# Patient Record
Sex: MALE | Race: WHITE | NOT HISPANIC OR LATINO
[De-identification: names, ages, dates, MRNs, and addresses within clinical notes are randomized per-mention and may not be internally consistent; named-entity substitution may affect disease eponyms.]

---

## 2018-12-26 ENCOUNTER — APPOINTMENT (OUTPATIENT)
Dept: NEUROSURGERY | Facility: CLINIC | Age: 48
End: 2018-12-26
Payer: MEDICARE

## 2018-12-26 DIAGNOSIS — Z78.9 OTHER SPECIFIED HEALTH STATUS: ICD-10-CM

## 2018-12-26 DIAGNOSIS — Z86.79 PERSONAL HISTORY OF OTHER DISEASES OF THE CIRCULATORY SYSTEM: ICD-10-CM

## 2018-12-26 DIAGNOSIS — Z85.46 PERSONAL HISTORY OF MALIGNANT NEOPLASM OF PROSTATE: ICD-10-CM

## 2018-12-26 DIAGNOSIS — Z86.69 PERSONAL HISTORY OF OTHER DISEASES OF THE NERVOUS SYSTEM AND SENSE ORGANS: ICD-10-CM

## 2018-12-26 PROCEDURE — 99214 OFFICE O/P EST MOD 30 MIN: CPT

## 2019-01-24 PROBLEM — Z86.79 HISTORY OF CORONARY ARTERY DISEASE: Status: RESOLVED | Noted: 2019-01-24 | Resolved: 2019-01-24

## 2019-01-24 PROBLEM — Z85.46 HISTORY OF MALIGNANT NEOPLASM OF PROSTATE: Status: RESOLVED | Noted: 2019-01-24 | Resolved: 2019-01-24

## 2019-01-24 PROBLEM — Z86.69 HISTORY OF SLEEP APNEA: Status: RESOLVED | Noted: 2019-01-24 | Resolved: 2019-01-24

## 2019-01-24 PROBLEM — Z78.9 NON-SMOKER: Status: ACTIVE | Noted: 2019-01-24

## 2019-01-24 NOTE — HISTORY OF PRESENT ILLNESS
[FreeTextEntry1] : I am seeing Mr. Cordova in f/u. He has myriad symptomatic complaints. He has had multiple traumatic injuries with compression fractures of various ages and has significant residual back back with radicular component. He has had multiple images of the thoracic and lumbar spine including myelogram which is significant only for T6/T8 compression fracture, post vertebroplasty, T9 and T12 compression deformity, sclerotic. No significant central or foraminal stenosis are noted. \par \par Mr. Cordova also discussed episodes of ?LOC. He was referred for EEG which showed no seizure activity. \par \par He underwent trial of SCS with more than 70% relief in LBP, however, his pain mangement DrIsabella no longer takes his insurance and was referred to another pain management Dr. for his pain f/u. \par \par He is pending endocrinology consultation to ensure good bone health. Today he is here to discuss further SCS implantation.

## 2019-01-24 NOTE — PHYSICAL EXAM
[FreeTextEntry1] : Constitutional: Well appearing, no distress\par HEENT: Normocephalic Atraumatic\par Psychiatric: Alert and oriented to all spheres, normal mood\par Pulmonary: no respiratory distress\par Abdomen: non-distended\par Vascular/Extremities: no edema, no cyanosis, no clubbing\par \par \par Neurologic: \par CN II-XII grossly intact\par ROM: severely restricted in thoracolumbar spine due to pain\par Palpation: pain to palpation in thoracic lumbar spine\par Strength: Full strength in all major muscle groups, no atrophy\par Sensation: Full sensation to light touch in all extremities\par Reflexes: \par               2+ patellar\par               2+ biceps\par               2+ ankle jerk\par              No Dent's\par              No clonus\par              No babinski\par \par Signs:\par SLR negative\par L'hermitte's negative\par \par Gait:antalgic\par \par \par \par

## 2019-03-21 RX ORDER — CLONAZEPAM 1 MG/1
1 TABLET ORAL
Refills: 0 | Status: ACTIVE | COMMUNITY

## 2019-03-21 RX ORDER — APIXABAN 5 MG/1
5 TABLET, FILM COATED ORAL
Refills: 0 | Status: ACTIVE | COMMUNITY

## 2019-03-21 RX ORDER — TAMSULOSIN HYDROCHLORIDE 0.4 MG/1
0.4 CAPSULE ORAL
Refills: 0 | Status: ACTIVE | COMMUNITY

## 2019-03-21 RX ORDER — ATORVASTATIN CALCIUM 80 MG/1
TABLET, FILM COATED ORAL
Refills: 0 | Status: ACTIVE | COMMUNITY

## 2019-03-25 ENCOUNTER — RESULT REVIEW (OUTPATIENT)
Age: 49
End: 2019-03-25

## 2019-03-25 ENCOUNTER — INPATIENT (INPATIENT)
Facility: HOSPITAL | Age: 49
LOS: 9 days | Discharge: NURSING, REHAB OR LONG-TERM CARE | End: 2019-04-04
Attending: STUDENT IN AN ORGANIZED HEALTH CARE EDUCATION/TRAINING PROGRAM | Admitting: STUDENT IN AN ORGANIZED HEALTH CARE EDUCATION/TRAINING PROGRAM
Payer: MEDICARE

## 2019-03-25 ENCOUNTER — APPOINTMENT (OUTPATIENT)
Dept: NEUROSURGERY | Facility: HOSPITAL | Age: 49
End: 2019-03-25
Payer: MEDICARE

## 2019-03-25 VITALS
RESPIRATION RATE: 18 BRPM | WEIGHT: 220.02 LBS | SYSTOLIC BLOOD PRESSURE: 153 MMHG | HEIGHT: 70 IN | DIASTOLIC BLOOD PRESSURE: 99 MMHG | TEMPERATURE: 97 F | HEART RATE: 69 BPM

## 2019-03-25 DIAGNOSIS — Z80.42 FAMILY HISTORY OF MALIGNANT NEOPLASM OF PROSTATE: Chronic | ICD-10-CM

## 2019-03-25 DIAGNOSIS — Z98.890 OTHER SPECIFIED POSTPROCEDURAL STATES: Chronic | ICD-10-CM

## 2019-03-25 DIAGNOSIS — Z95.810 PRESENCE OF AUTOMATIC (IMPLANTABLE) CARDIAC DEFIBRILLATOR: Chronic | ICD-10-CM

## 2019-03-25 LAB — GAS PNL BLDA: SIGNIFICANT CHANGE UP

## 2019-03-25 PROCEDURE — 63685 INS/RPLC SPI NPG/RCVR POCKET: CPT | Mod: AS

## 2019-03-25 PROCEDURE — 63685 INS/RPLC SPI NPG/RCVR POCKET: CPT

## 2019-03-25 PROCEDURE — 88311 DECALCIFY TISSUE: CPT | Mod: 26

## 2019-03-25 PROCEDURE — 63655 IMPLANT NEUROELECTRODES: CPT | Mod: AS

## 2019-03-25 PROCEDURE — 88304 TISSUE EXAM BY PATHOLOGIST: CPT | Mod: 26

## 2019-03-25 PROCEDURE — 63655 IMPLANT NEUROELECTRODES: CPT

## 2019-03-25 RX ORDER — ACETAMINOPHEN 500 MG
650 TABLET ORAL EVERY 6 HOURS
Qty: 0 | Refills: 0 | Status: DISCONTINUED | OUTPATIENT
Start: 2019-03-25 | End: 2019-04-04

## 2019-03-25 RX ORDER — SENNA PLUS 8.6 MG/1
2 TABLET ORAL AT BEDTIME
Qty: 0 | Refills: 0 | Status: DISCONTINUED | OUTPATIENT
Start: 2019-03-25 | End: 2019-03-30

## 2019-03-25 RX ORDER — OXYCODONE AND ACETAMINOPHEN 5; 325 MG/1; MG/1
2 TABLET ORAL EVERY 4 HOURS
Qty: 0 | Refills: 0 | Status: DISCONTINUED | OUTPATIENT
Start: 2019-03-25 | End: 2019-04-01

## 2019-03-25 RX ORDER — HYDROMORPHONE HYDROCHLORIDE 2 MG/ML
1 INJECTION INTRAMUSCULAR; INTRAVENOUS; SUBCUTANEOUS
Qty: 0 | Refills: 0 | Status: DISCONTINUED | OUTPATIENT
Start: 2019-03-25 | End: 2019-03-25

## 2019-03-25 RX ORDER — PANTOPRAZOLE SODIUM 20 MG/1
40 TABLET, DELAYED RELEASE ORAL
Qty: 0 | Refills: 0 | Status: DISCONTINUED | OUTPATIENT
Start: 2019-03-25 | End: 2019-04-04

## 2019-03-25 RX ORDER — SODIUM CHLORIDE 9 MG/ML
1000 INJECTION, SOLUTION INTRAVENOUS
Qty: 0 | Refills: 0 | Status: DISCONTINUED | OUTPATIENT
Start: 2019-03-25 | End: 2019-03-26

## 2019-03-25 RX ORDER — ALPRAZOLAM 0.25 MG
1 TABLET ORAL THREE TIMES A DAY
Qty: 0 | Refills: 0 | Status: DISCONTINUED | OUTPATIENT
Start: 2019-03-25 | End: 2019-03-25

## 2019-03-25 RX ORDER — METHOCARBAMOL 500 MG/1
750 TABLET, FILM COATED ORAL EVERY 8 HOURS
Qty: 0 | Refills: 0 | Status: DISCONTINUED | OUTPATIENT
Start: 2019-03-25 | End: 2019-04-04

## 2019-03-25 RX ORDER — MORPHINE SULFATE 50 MG/1
2 CAPSULE, EXTENDED RELEASE ORAL EVERY 4 HOURS
Qty: 0 | Refills: 0 | Status: DISCONTINUED | OUTPATIENT
Start: 2019-03-25 | End: 2019-03-31

## 2019-03-25 RX ORDER — OXYCODONE AND ACETAMINOPHEN 5; 325 MG/1; MG/1
1 TABLET ORAL EVERY 4 HOURS
Qty: 0 | Refills: 0 | Status: DISCONTINUED | OUTPATIENT
Start: 2019-03-25 | End: 2019-03-25

## 2019-03-25 RX ORDER — ONDANSETRON 8 MG/1
4 TABLET, FILM COATED ORAL EVERY 6 HOURS
Qty: 0 | Refills: 0 | Status: DISCONTINUED | OUTPATIENT
Start: 2019-03-25 | End: 2019-04-04

## 2019-03-25 RX ORDER — CEFAZOLIN SODIUM 1 G
1000 VIAL (EA) INJECTION EVERY 8 HOURS
Qty: 0 | Refills: 0 | Status: COMPLETED | OUTPATIENT
Start: 2019-03-25 | End: 2019-03-26

## 2019-03-25 RX ORDER — LURASIDONE HYDROCHLORIDE 40 MG/1
60 TABLET ORAL DAILY
Qty: 0 | Refills: 0 | Status: DISCONTINUED | OUTPATIENT
Start: 2019-03-25 | End: 2019-04-04

## 2019-03-25 RX ORDER — HYDROMORPHONE HYDROCHLORIDE 2 MG/ML
0.5 INJECTION INTRAMUSCULAR; INTRAVENOUS; SUBCUTANEOUS
Qty: 0 | Refills: 0 | Status: DISCONTINUED | OUTPATIENT
Start: 2019-03-25 | End: 2019-03-26

## 2019-03-25 RX ORDER — ONDANSETRON 8 MG/1
4 TABLET, FILM COATED ORAL ONCE
Qty: 0 | Refills: 0 | Status: DISCONTINUED | OUTPATIENT
Start: 2019-03-25 | End: 2019-03-26

## 2019-03-25 RX ORDER — DOCUSATE SODIUM 100 MG
100 CAPSULE ORAL THREE TIMES A DAY
Qty: 0 | Refills: 0 | Status: DISCONTINUED | OUTPATIENT
Start: 2019-03-25 | End: 2019-04-04

## 2019-03-25 RX ORDER — CLONAZEPAM 1 MG
1 TABLET ORAL EVERY 12 HOURS
Qty: 0 | Refills: 0 | Status: DISCONTINUED | OUTPATIENT
Start: 2019-03-25 | End: 2019-03-31

## 2019-03-25 RX ORDER — SERTRALINE 25 MG/1
200 TABLET, FILM COATED ORAL DAILY
Qty: 0 | Refills: 0 | Status: DISCONTINUED | OUTPATIENT
Start: 2019-03-25 | End: 2019-04-04

## 2019-03-25 RX ADMIN — HYDROMORPHONE HYDROCHLORIDE 1 MILLIGRAM(S): 2 INJECTION INTRAMUSCULAR; INTRAVENOUS; SUBCUTANEOUS at 20:17

## 2019-03-25 RX ADMIN — SODIUM CHLORIDE 75 MILLILITER(S): 9 INJECTION, SOLUTION INTRAVENOUS at 22:41

## 2019-03-25 RX ADMIN — HYDROMORPHONE HYDROCHLORIDE 1 MILLIGRAM(S): 2 INJECTION INTRAMUSCULAR; INTRAVENOUS; SUBCUTANEOUS at 20:41

## 2019-03-25 RX ADMIN — HYDROMORPHONE HYDROCHLORIDE 1 MILLIGRAM(S): 2 INJECTION INTRAMUSCULAR; INTRAVENOUS; SUBCUTANEOUS at 20:38

## 2019-03-25 RX ADMIN — METHOCARBAMOL 750 MILLIGRAM(S): 500 TABLET, FILM COATED ORAL at 21:16

## 2019-03-25 RX ADMIN — HYDROMORPHONE HYDROCHLORIDE 1 MILLIGRAM(S): 2 INJECTION INTRAMUSCULAR; INTRAVENOUS; SUBCUTANEOUS at 22:41

## 2019-03-25 RX ADMIN — HYDROMORPHONE HYDROCHLORIDE 1 MILLIGRAM(S): 2 INJECTION INTRAMUSCULAR; INTRAVENOUS; SUBCUTANEOUS at 21:43

## 2019-03-25 RX ADMIN — Medication 100 MILLIGRAM(S): at 21:18

## 2019-03-25 RX ADMIN — Medication 1 MILLIGRAM(S): at 22:57

## 2019-03-25 RX ADMIN — HYDROMORPHONE HYDROCHLORIDE 1 MILLIGRAM(S): 2 INJECTION INTRAMUSCULAR; INTRAVENOUS; SUBCUTANEOUS at 21:08

## 2019-03-25 RX ADMIN — Medication 100 MILLIGRAM(S): at 21:09

## 2019-03-25 NOTE — PROGRESS NOTE ADULT - ASSESSMENT
50 yo male doing well POD # 0 s/p thoracic laminectomy and spinal cord stimulator implantation    plan  -analgesia prn  -d/c home in am

## 2019-03-25 NOTE — ASU PATIENT PROFILE, ADULT - PMH
AICD present, double chamber  pacemaker/defibrillater  Anxiety and depression    Bipolar 1 disorder    BPH (benign prostatic hyperplasia)    CHF (congestive heart failure)  cardiomyapathy  GERD (gastroesophageal reflux disease)    MI, old  1996  Prostate cancer

## 2019-03-25 NOTE — BRIEF OPERATIVE NOTE - NSICDXBRIEFPREOP_GEN_ALL_CORE_FT
PRE-OP DIAGNOSIS:  Thoracic compression fracture 25-Mar-2019 19:19:46  Gail Johnson  Lumbago 25-Mar-2019 19:19:38  Gail Johnson

## 2019-03-25 NOTE — PROGRESS NOTE ADULT - SUBJECTIVE AND OBJECTIVE BOX
48 yo male POD # 0 s/p thoracic laminectomy and spinal cord stimulator implantation  pt c/o minor incisional pain, denies n/v/f/c    Vital Signs Last 24 Hrs  T(C): 36.6 (25 Mar 2019 20:03), Max: 36.6 (25 Mar 2019 20:03)  T(F): 97.8 (25 Mar 2019 20:03), Max: 97.8 (25 Mar 2019 20:03)  HR: 70 (25 Mar 2019 20:58) (69 - 70)  BP: 128/83 (25 Mar 2019 20:58) (108/84 - 153/99)  RR: 19 (25 Mar 2019 20:58) (18 - 27)  SpO2: 95% (25 Mar 2019 20:28) (94% - 95%)    pt seen and examined at bedside  a+ox3, nad, non toxic  nc/at, perrl  CANO x4, strength 5/5 x4, sensation intact x4  both incisions c/d/i, no swelling

## 2019-03-25 NOTE — BRIEF OPERATIVE NOTE - NSICDXBRIEFPOSTOP_GEN_ALL_CORE_FT
POST-OP DIAGNOSIS:  Thoracic compression fracture 25-Mar-2019 19:20:36  Gail Johnson  Lumbago 25-Mar-2019 19:19:57  Gail Johnson
Oriented - self; Oriented - place; Oriented - time

## 2019-03-25 NOTE — ASU PATIENT PROFILE, ADULT - PSH
FHx: prostate cancer    History of implantable cardioverter-defibrillator (ICD) placement    S/P insertion of spinal cord stimulator  temporary

## 2019-03-25 NOTE — BRIEF OPERATIVE NOTE - NSICDXBRIEFPROCEDURE_GEN_ALL_CORE_FT
PROCEDURES:  Laminectomy, spine, thoracic, with spinal cord stimulator insertion 25-Mar-2019 19:19:18  Gail Johnson

## 2019-03-25 NOTE — CHART NOTE - NSCHARTNOTEFT_GEN_A_CORE
PACU ANESTHESIA ADMISSION NOTE      Procedure: Laminectomy, spine, thoracic, with spinal cord stimulator insertion    Post op diagnosis:  Thoracic compression fracture  Lumbago      ____  Intubated  TV:______       Rate: ______      FiO2: ______    __x__  Patent Airway    __x__  Full return of protective reflexes    __x__  Full recovery from anesthesia / back to baseline status      Vitals:   /84           HR    70       RR   12          O2 sat   98        Temp 99      Mental Status:  __x__ Awake   _____ Alert   _____ Drowsy   _____ Sedated    Nausea/Vomiting:  __x__ No    ____ Yes, See Post - Op Orders        Pain Scale (0-10):  __0___    Treatment: ____ None    ____ See Post - Op/PCA Orders    Post - Operative Fluids:   __x__ Oral   ____ See Post - Op Orders    Plan: Discharge:   ____Home       ___x__Floor     _____Critical Care    _____  Other:_________________    Comments: No anesthesia complications noted.  Discharge once criteria met.

## 2019-03-26 RX ORDER — HEPARIN SODIUM 5000 [USP'U]/ML
5000 INJECTION INTRAVENOUS; SUBCUTANEOUS EVERY 8 HOURS
Qty: 0 | Refills: 0 | Status: DISCONTINUED | OUTPATIENT
Start: 2019-03-26 | End: 2019-03-30

## 2019-03-26 RX ORDER — FUROSEMIDE 40 MG
80 TABLET ORAL DAILY
Qty: 0 | Refills: 0 | Status: DISCONTINUED | OUTPATIENT
Start: 2019-03-26 | End: 2019-04-04

## 2019-03-26 RX ORDER — TAMSULOSIN HYDROCHLORIDE 0.4 MG/1
0.4 CAPSULE ORAL AT BEDTIME
Qty: 0 | Refills: 0 | Status: DISCONTINUED | OUTPATIENT
Start: 2019-03-26 | End: 2019-04-04

## 2019-03-26 RX ADMIN — HYDROMORPHONE HYDROCHLORIDE 0.5 MILLIGRAM(S): 2 INJECTION INTRAMUSCULAR; INTRAVENOUS; SUBCUTANEOUS at 01:18

## 2019-03-26 RX ADMIN — Medication 100 MILLIGRAM(S): at 05:05

## 2019-03-26 RX ADMIN — OXYCODONE AND ACETAMINOPHEN 2 TABLET(S): 5; 325 TABLET ORAL at 12:00

## 2019-03-26 RX ADMIN — OXYCODONE AND ACETAMINOPHEN 2 TABLET(S): 5; 325 TABLET ORAL at 08:12

## 2019-03-26 RX ADMIN — LURASIDONE HYDROCHLORIDE 60 MILLIGRAM(S): 40 TABLET ORAL at 17:17

## 2019-03-26 RX ADMIN — Medication 1 TABLET(S): at 11:56

## 2019-03-26 RX ADMIN — MORPHINE SULFATE 2 MILLIGRAM(S): 50 CAPSULE, EXTENDED RELEASE ORAL at 04:05

## 2019-03-26 RX ADMIN — HYDROMORPHONE HYDROCHLORIDE 0.5 MILLIGRAM(S): 2 INJECTION INTRAMUSCULAR; INTRAVENOUS; SUBCUTANEOUS at 00:04

## 2019-03-26 RX ADMIN — Medication 80 MILLIGRAM(S): at 17:16

## 2019-03-26 RX ADMIN — OXYCODONE AND ACETAMINOPHEN 2 TABLET(S): 5; 325 TABLET ORAL at 21:11

## 2019-03-26 RX ADMIN — Medication 100 MILLIGRAM(S): at 15:27

## 2019-03-26 RX ADMIN — OXYCODONE AND ACETAMINOPHEN 2 TABLET(S): 5; 325 TABLET ORAL at 16:30

## 2019-03-26 RX ADMIN — MORPHINE SULFATE 2 MILLIGRAM(S): 50 CAPSULE, EXTENDED RELEASE ORAL at 04:48

## 2019-03-26 RX ADMIN — METHOCARBAMOL 750 MILLIGRAM(S): 500 TABLET, FILM COATED ORAL at 15:29

## 2019-03-26 RX ADMIN — SERTRALINE 200 MILLIGRAM(S): 25 TABLET, FILM COATED ORAL at 11:56

## 2019-03-26 RX ADMIN — Medication 1 MILLIGRAM(S): at 22:34

## 2019-03-26 RX ADMIN — HEPARIN SODIUM 5000 UNIT(S): 5000 INJECTION INTRAVENOUS; SUBCUTANEOUS at 22:33

## 2019-03-26 RX ADMIN — Medication 100 MILLIGRAM(S): at 22:33

## 2019-03-26 RX ADMIN — METHOCARBAMOL 750 MILLIGRAM(S): 500 TABLET, FILM COATED ORAL at 05:05

## 2019-03-26 RX ADMIN — PANTOPRAZOLE SODIUM 40 MILLIGRAM(S): 20 TABLET, DELAYED RELEASE ORAL at 08:11

## 2019-03-26 RX ADMIN — METHOCARBAMOL 750 MILLIGRAM(S): 500 TABLET, FILM COATED ORAL at 22:35

## 2019-03-26 RX ADMIN — TAMSULOSIN HYDROCHLORIDE 0.4 MILLIGRAM(S): 0.4 CAPSULE ORAL at 22:35

## 2019-03-26 RX ADMIN — MORPHINE SULFATE 2 MILLIGRAM(S): 50 CAPSULE, EXTENDED RELEASE ORAL at 22:34

## 2019-03-26 RX ADMIN — TAMSULOSIN HYDROCHLORIDE 0.4 MILLIGRAM(S): 0.4 CAPSULE ORAL at 12:28

## 2019-03-26 NOTE — PROGRESS NOTE ADULT - SUBJECTIVE AND OBJECTIVE BOX
Subjective: 49yMale with a pmhx of M54.16, 01600, 20700, 73103/CONVERSIONS  M54.16, 64823, 81931, 68730  ^M54.16, 21620, 15357, 19138  GERD (gastroesophageal reflux disease)  Anxiety and depression  Bipolar 1 disorder  AICD present, double chamber  Prostate cancer  BPH (benign prostatic hyperplasia)  CHF (congestive heart failure)  MI, old  Thoracic compression fracture  Lumbago  Thoracic compression fracture  Lumbago  Laminectomy, spine, thoracic, with spinal cord stimulator insertion  S/P insertion of spinal cord stimulator  History of implantable cardioverter-defibrillator (ICD) placement  FHx: prostate cancer      POD # 1   s/p thoracic laminectomy and spinal cord stimulator implantation    pt c/o minor incisional pain,  had to have archibald reinserted last  night 2/2 retention.      Allergies    No Known Allergies    Intolerances        Vital Signs Last 24 Hrs  T(C): 37.5 (26 Mar 2019 13:29), Max: 37.5 (26 Mar 2019 13:29)  T(F): 99.5 (26 Mar 2019 13:29), Max: 99.5 (26 Mar 2019 13:29)  HR: 78 (26 Mar 2019 13:29) (69 - 93)  BP: 119/74 (26 Mar 2019 13:29) (108/84 - 153/99)  BP(mean): --  RR: 18 (26 Mar 2019 13:29) (17 - 27)  SpO2: 95% (26 Mar 2019 05:10) (94% - 95%)      acetaminophen   Tablet .. 650 milliGRAM(s) Oral every 6 hours PRN  ceFAZolin   IVPB 1000 milliGRAM(s) IV Intermittent every 8 hours  clonazePAM Tablet 1 milliGRAM(s) Oral every 12 hours PRN  docusate sodium 100 milliGRAM(s) Oral three times a day  lurasidone 60 milliGRAM(s) Oral daily  methocarbamol 750 milliGRAM(s) Oral every 8 hours  morphine  - Injectable 2 milliGRAM(s) IV Push every 4 hours PRN  multivitamin 1 Tablet(s) Oral daily  ondansetron Injectable 4 milliGRAM(s) IV Push every 6 hours PRN  oxyCODONE    5 mG/acetaminophen 325 mG 1 Tablet(s) Oral every 4 hours PRN  oxyCODONE    5 mG/acetaminophen 325 mG 2 Tablet(s) Oral every 4 hours PRN  pantoprazole    Tablet 40 milliGRAM(s) Oral before breakfast  senna 2 Tablet(s) Oral at bedtime PRN  sertraline 200 milliGRAM(s) Oral daily  tamsulosin 0.4 milliGRAM(s) Oral at bedtime        03-25-19 @ 07:01  -  03-26-19 @ 07:00  --------------------------------------------------------  IN: 600 mL / OUT: 700 mL / NET: -100 mL          Exam:  AAOX3. Verbal function intact  follows commands  Motor: MAEx4  5/5 power in b/l  LE          Assessment/Plan: as above  PT/rehab  pain control  start Flomax  d/c cristela at [facility]  discussed w attg

## 2019-03-26 NOTE — CONSULT NOTE ADULT - SUBJECTIVE AND OBJECTIVE BOX
HPI:48 yo M admitted for placement of SC stim placement. He has a history of remote alcoholism, alcoholic cardiomyopathy.      PAST MEDICAL & SURGICAL HISTORY:  GERD (gastroesophageal reflux disease)  Anxiety and depression  Bipolar 1 disorder  AICD present, double chamber: pacemaker/defibrillater  Prostate cancer  BPH (benign prostatic hyperplasia)  CHF (congestive heart failure): cardiomyapathy  MI, old: 1996  S/P insertion of spinal cord stimulator: temporary  History of implantable cardioverter-defibrillator (ICD) placement  FHx: prostate cancer      Hospital Course:  Staying with a friend.  Lots of pain today.  TODAY'S SUBJECTIVE & REVIEW OF SYMPTOMS:     Constitutional WNL   Cardio WNL   Resp WNL   GI WNL  Heme WNL  Endo WNL  Skin WNL  MSK WNL  Neuro WNL  Cognitive WNL  Psych WNL      MEDICATIONS  (STANDING):  ceFAZolin   IVPB 1000 milliGRAM(s) IV Intermittent every 8 hours  docusate sodium 100 milliGRAM(s) Oral three times a day  lurasidone 60 milliGRAM(s) Oral daily  methocarbamol 750 milliGRAM(s) Oral every 8 hours  multivitamin 1 Tablet(s) Oral daily  pantoprazole    Tablet 40 milliGRAM(s) Oral before breakfast  sertraline 200 milliGRAM(s) Oral daily  tamsulosin 0.4 milliGRAM(s) Oral at bedtime    MEDICATIONS  (PRN):  acetaminophen   Tablet .. 650 milliGRAM(s) Oral every 6 hours PRN Temp greater or equal to 38C (100.4F)  clonazePAM Tablet 1 milliGRAM(s) Oral every 12 hours PRN anxiety  morphine  - Injectable 2 milliGRAM(s) IV Push every 4 hours PRN Severe Pain (7 - 10)  ondansetron Injectable 4 milliGRAM(s) IV Push every 6 hours PRN Nausea and/or Vomiting  oxyCODONE    5 mG/acetaminophen 325 mG 1 Tablet(s) Oral every 4 hours PRN Mild Pain (1 - 3)  oxyCODONE    5 mG/acetaminophen 325 mG 2 Tablet(s) Oral every 4 hours PRN Moderate Pain (4 - 6)  senna 2 Tablet(s) Oral at bedtime PRN Constipation      FAMILY HISTORY:      Allergies    No Known Allergies    Intolerances        SOCIAL HISTORY:    [  ] Etoh  [  ] Smoking  [  ] Substance abuse     Home Environment:  [  ] Home Alone  [x  ] stays with friend  [  ] Home Health Aid    Dwelling:  [  ] Apartment  [  ] Private House  [  ] Adult Home  [  ] Skilled Nursing Facility      [  ] Short Term  [  ] Long Term  [  ] Stairs       Elevator [  ]    FUNCTIONAL STATUS PTA: (Check all that apply)  Ambulation: [x   ]Independent    [  ] Dependent     [  ] Non-Ambulatory  Assistive Device: [  ] SA Cane  [  ]  Q Cane  [  ] Walker  [  ]  Wheelchair  ADL : [  ] Independent  [  ]  Dependent       Vital Signs Last 24 Hrs  T(C): 37.5 (26 Mar 2019 13:29), Max: 37.5 (26 Mar 2019 13:29)  T(F): 99.5 (26 Mar 2019 13:29), Max: 99.5 (26 Mar 2019 13:29)  HR: 78 (26 Mar 2019 13:29) (69 - 93)  BP: 119/74 (26 Mar 2019 13:29) (108/84 - 153/99)  BP(mean): --  RR: 18 (26 Mar 2019 13:29) (17 - 27)  SpO2: 95% (26 Mar 2019 05:10) (94% - 95%)      PHYSICAL EXAM: Alert & Oriented X3  GENERAL: NAD, well-groomed, well-developed  HEAD:  Atraumatic, Normocephalic  EYES: EOMI, PERRLA, conjunctiva and sclera clear  NECK: Supple, No JVD, Normal thyroid  CHEST/LUNG: Clear to percussion bilaterally; No rales, rhonchi, wheezing, or rubs  HEART: Regular rate and rhythm; No murmurs, rubs, or gallops  ABDOMEN: Soft, Nontender, Nondistended; Bowel sounds present  EXTREMITIES:  2+ Peripheral Pulses, No clubbing, cyanosis, or edema    NERVOUS SYSTEM:  Cranial Nerves 2-12 intact [  ] Abnormal  [  ]  ROM: WFL all extremities [  ]  Abnormal [  ]  Motor Strength: WFL all extremities  [  ]  Abnormal [  ] good bilateral ankle Df/PF  Sensation: intact to light touch [  ] Abnormal [  ]  Reflexes: Symmetric [  ]  Abnormal [  ]    FUNCTIONAL STATUS:  Bed Mobility: Independent [  ]  Supervision [  ]  Needs Assistance [  ]  N/A [  ]  Transfers: Independent [  ]  Supervision [  ]  Needs Assistance [  ]  N/A [  ]   Ambulation: Independent [  ]  Supervision [  ]  Needs Assistance [  ]  N/A [  ]  ADL: Independent [  ] Requires Assistance [  ] N/A [  ]      LABS:                RADIOLOGY & ADDITIONAL STUDIES:    Assesment:

## 2019-03-26 NOTE — PHYSICAL THERAPY INITIAL EVALUATION ADULT - GENERAL OBSERVATIONS, REHAB EVAL
9:26 Pt encountered semifowler in bed in NAD. + IV. Pt declined PT at this time 2* to pain. Pt reports did receive pain meds, which helped reduce some pain but not ready for OOB. Will f/u with PT.
15:20-15:45 pt encountered supine in bed in NAD. + archibald.

## 2019-03-26 NOTE — CONSULT NOTE ADULT - ASSESSMENT
IMPRESSION: Rehab of gait abnl, low back pain, s/p placement spinal cord stim., pacemaker    PRECAUTIONS: [x  ] Cardiac  [  ] Respiratory  [  ] Seizures [  ] Contact Isolation  [  ] Droplet Isolation  [  ] Other    Weight Bearing Status:     RECOMMENDATION:    Out of Bed to Chair     DVT/Decubiti Prophylaxis    REHAB PLAN:     [xx   ] Bedside P/T 3-5 times a week   [   ]   Bedside O/T  2-3 times a week             [   ] No Rehab Therapy Indicated                   [   ]  Speech Therapy   Conditioning/ROM                                    ADL  Bed Mobility                                               Conditioning/ROM  Transfers                                                     Bed Mobility  Sitting /Standing Balance                         Transfers                                        Gait Training                                               Sitting/Standing Balance  Stair Training [   ]Applicable                    Home equipment Eval                                                                        Splinting  [   ] Only      GOALS:   ADL   [x   ]   Independent                    Transfers  [ x  ] Independent                          Ambulation  [ x  ] Independent     [x    ] With device                            [   ]  CG                                                         [   ]  CG                                                                  [   ] CG                            [    ] Min A                                                   [   ] Min A                                                              [   ] Min  A          DISCHARGE PLAN:   [   ]  Good candidate for Intensive Rehabilitation/Hospital based-[facility]                                             Will tolerate 3hrs Intensive Rehab Daily                                       [    ]  Short Term Rehab in Skilled Nursing Facility                                       [  xx  ]  Home with Outpatient or VN services                                         [    ]  Possible Candidate for Intensive Hospital based Rehab

## 2019-03-27 LAB
HCT VFR BLD CALC: 41.1 % — LOW (ref 42–52)
HGB BLD-MCNC: 13.6 G/DL — LOW (ref 14–18)
MCHC RBC-ENTMCNC: 30.9 PG — SIGNIFICANT CHANGE UP (ref 27–31)
MCHC RBC-ENTMCNC: 33.1 G/DL — SIGNIFICANT CHANGE UP (ref 32–37)
MCV RBC AUTO: 93.4 FL — SIGNIFICANT CHANGE UP (ref 80–94)
NRBC # BLD: 0 /100 WBCS — SIGNIFICANT CHANGE UP (ref 0–0)
PLATELET # BLD AUTO: 142 K/UL — SIGNIFICANT CHANGE UP (ref 130–400)
RBC # BLD: 4.4 M/UL — LOW (ref 4.7–6.1)
RBC # FLD: 16.1 % — HIGH (ref 11.5–14.5)
WBC # BLD: 11.95 K/UL — HIGH (ref 4.8–10.8)
WBC # FLD AUTO: 11.95 K/UL — HIGH (ref 4.8–10.8)

## 2019-03-27 RX ADMIN — OXYCODONE AND ACETAMINOPHEN 2 TABLET(S): 5; 325 TABLET ORAL at 10:04

## 2019-03-27 RX ADMIN — Medication 80 MILLIGRAM(S): at 06:04

## 2019-03-27 RX ADMIN — LURASIDONE HYDROCHLORIDE 60 MILLIGRAM(S): 40 TABLET ORAL at 12:03

## 2019-03-27 RX ADMIN — MORPHINE SULFATE 2 MILLIGRAM(S): 50 CAPSULE, EXTENDED RELEASE ORAL at 10:27

## 2019-03-27 RX ADMIN — OXYCODONE AND ACETAMINOPHEN 2 TABLET(S): 5; 325 TABLET ORAL at 14:52

## 2019-03-27 RX ADMIN — OXYCODONE AND ACETAMINOPHEN 2 TABLET(S): 5; 325 TABLET ORAL at 08:39

## 2019-03-27 RX ADMIN — Medication 100 MILLIGRAM(S): at 06:04

## 2019-03-27 RX ADMIN — MORPHINE SULFATE 2 MILLIGRAM(S): 50 CAPSULE, EXTENDED RELEASE ORAL at 14:38

## 2019-03-27 RX ADMIN — METHOCARBAMOL 750 MILLIGRAM(S): 500 TABLET, FILM COATED ORAL at 06:04

## 2019-03-27 RX ADMIN — Medication 1 TABLET(S): at 12:03

## 2019-03-27 RX ADMIN — Medication 1 MILLIGRAM(S): at 23:04

## 2019-03-27 RX ADMIN — OXYCODONE AND ACETAMINOPHEN 2 TABLET(S): 5; 325 TABLET ORAL at 18:33

## 2019-03-27 RX ADMIN — OXYCODONE AND ACETAMINOPHEN 2 TABLET(S): 5; 325 TABLET ORAL at 23:05

## 2019-03-27 RX ADMIN — HEPARIN SODIUM 5000 UNIT(S): 5000 INJECTION INTRAVENOUS; SUBCUTANEOUS at 21:45

## 2019-03-27 RX ADMIN — MORPHINE SULFATE 2 MILLIGRAM(S): 50 CAPSULE, EXTENDED RELEASE ORAL at 18:06

## 2019-03-27 RX ADMIN — HEPARIN SODIUM 5000 UNIT(S): 5000 INJECTION INTRAVENOUS; SUBCUTANEOUS at 06:05

## 2019-03-27 RX ADMIN — METHOCARBAMOL 750 MILLIGRAM(S): 500 TABLET, FILM COATED ORAL at 14:38

## 2019-03-27 RX ADMIN — MORPHINE SULFATE 2 MILLIGRAM(S): 50 CAPSULE, EXTENDED RELEASE ORAL at 17:30

## 2019-03-27 RX ADMIN — METHOCARBAMOL 750 MILLIGRAM(S): 500 TABLET, FILM COATED ORAL at 21:44

## 2019-03-27 RX ADMIN — OXYCODONE AND ACETAMINOPHEN 2 TABLET(S): 5; 325 TABLET ORAL at 02:04

## 2019-03-27 RX ADMIN — Medication 100 MILLIGRAM(S): at 21:45

## 2019-03-27 RX ADMIN — Medication 100 MILLIGRAM(S): at 14:38

## 2019-03-27 RX ADMIN — OXYCODONE AND ACETAMINOPHEN 2 TABLET(S): 5; 325 TABLET ORAL at 12:46

## 2019-03-27 RX ADMIN — TAMSULOSIN HYDROCHLORIDE 0.4 MILLIGRAM(S): 0.4 CAPSULE ORAL at 21:44

## 2019-03-27 RX ADMIN — Medication 1 MILLIGRAM(S): at 10:27

## 2019-03-27 RX ADMIN — SERTRALINE 200 MILLIGRAM(S): 25 TABLET, FILM COATED ORAL at 12:03

## 2019-03-27 RX ADMIN — OXYCODONE AND ACETAMINOPHEN 2 TABLET(S): 5; 325 TABLET ORAL at 02:34

## 2019-03-27 RX ADMIN — HEPARIN SODIUM 5000 UNIT(S): 5000 INJECTION INTRAVENOUS; SUBCUTANEOUS at 14:38

## 2019-03-27 RX ADMIN — MORPHINE SULFATE 2 MILLIGRAM(S): 50 CAPSULE, EXTENDED RELEASE ORAL at 21:45

## 2019-03-27 RX ADMIN — PANTOPRAZOLE SODIUM 40 MILLIGRAM(S): 20 TABLET, DELAYED RELEASE ORAL at 06:06

## 2019-03-27 NOTE — PROGRESS NOTE ADULT - SUBJECTIVE AND OBJECTIVE BOX
Subjective: 49yMale with a pmhx of M54.16, 15498, 82938, 98067/CONVERSIONS  M54.16, 75714, 82128, 38112  ^M54.16, 04244, 82045, 90381  GERD (gastroesophageal reflux disease)  Anxiety and depression  Bipolar 1 disorder  AICD present, double chamber  Prostate cancer  BPH (benign prostatic hyperplasia)  CHF (congestive heart failure)  MI, old  Thoracic compression fracture  Lumbago  Thoracic compression fracture  Lumbago  Laminectomy, spine, thoracic, with spinal cord stimulator insertion  S/P insertion of spinal cord stimulator  History of implantable cardioverter-defibrillator (ICD) placement  FHx: prostate cancer        POD # 2  s/p thoracic laminectomy and spinal cord stimulator implantation    Pt seen and examined at bedside w Dr Pritchard.  pt c/o minor incisional pain,  Flomax started.  Ramirez d/c'd overnight and pt voided.      Allergies    No Known Allergies    Intolerances        Vital Signs Last 24 Hrs  T(C): 35.7 (27 Mar 2019 06:13), Max: 37.5 (26 Mar 2019 13:29)  T(F): 96.2 (27 Mar 2019 06:13), Max: 99.5 (26 Mar 2019 13:29)  HR: 70 (27 Mar 2019 06:13) (70 - 78)  BP: 121/72 (27 Mar 2019 06:13) (119/74 - 121/72)  BP(mean): --  RR: 19 (27 Mar 2019 06:13) (18 - 19)  SpO2: --      acetaminophen   Tablet .. 650 milliGRAM(s) Oral every 6 hours PRN  clonazePAM Tablet 1 milliGRAM(s) Oral every 12 hours PRN  docusate sodium 100 milliGRAM(s) Oral three times a day  furosemide    Tablet 80 milliGRAM(s) Oral daily  heparin  Injectable 5000 Unit(s) SubCutaneous every 8 hours  lurasidone 60 milliGRAM(s) Oral daily  methocarbamol 750 milliGRAM(s) Oral every 8 hours  morphine  - Injectable 2 milliGRAM(s) IV Push every 4 hours PRN  multivitamin 1 Tablet(s) Oral daily  ondansetron Injectable 4 milliGRAM(s) IV Push every 6 hours PRN  oxyCODONE    5 mG/acetaminophen 325 mG 1 Tablet(s) Oral every 4 hours PRN  oxyCODONE    5 mG/acetaminophen 325 mG 2 Tablet(s) Oral every 4 hours PRN  pantoprazole    Tablet 40 milliGRAM(s) Oral before breakfast  senna 2 Tablet(s) Oral at bedtime PRN  sertraline 200 milliGRAM(s) Oral daily  tamsulosin 0.4 milliGRAM(s) Oral at bedtime        03-26-19 @ 07:01  -  03-27-19 @ 07:00  --------------------------------------------------------  IN: 0 mL / OUT: 3800 mL / NET: -3800 mL          Exam:  AAOX3. Verbal function intact  follows commands  Motor: MAEx4  5/5 power in  LE's  Sensation: intact b/l           Imaging:     Assessment/Plan: as above  PT/rehab  pain control  discussed w attg

## 2019-03-28 RX ADMIN — Medication 100 MILLIGRAM(S): at 06:13

## 2019-03-28 RX ADMIN — OXYCODONE AND ACETAMINOPHEN 2 TABLET(S): 5; 325 TABLET ORAL at 19:08

## 2019-03-28 RX ADMIN — SERTRALINE 200 MILLIGRAM(S): 25 TABLET, FILM COATED ORAL at 11:05

## 2019-03-28 RX ADMIN — METHOCARBAMOL 750 MILLIGRAM(S): 500 TABLET, FILM COATED ORAL at 06:13

## 2019-03-28 RX ADMIN — LURASIDONE HYDROCHLORIDE 60 MILLIGRAM(S): 40 TABLET ORAL at 12:03

## 2019-03-28 RX ADMIN — Medication 1 MILLIGRAM(S): at 12:02

## 2019-03-28 RX ADMIN — METHOCARBAMOL 750 MILLIGRAM(S): 500 TABLET, FILM COATED ORAL at 22:22

## 2019-03-28 RX ADMIN — MORPHINE SULFATE 2 MILLIGRAM(S): 50 CAPSULE, EXTENDED RELEASE ORAL at 06:13

## 2019-03-28 RX ADMIN — MORPHINE SULFATE 2 MILLIGRAM(S): 50 CAPSULE, EXTENDED RELEASE ORAL at 16:57

## 2019-03-28 RX ADMIN — MORPHINE SULFATE 2 MILLIGRAM(S): 50 CAPSULE, EXTENDED RELEASE ORAL at 22:20

## 2019-03-28 RX ADMIN — TAMSULOSIN HYDROCHLORIDE 0.4 MILLIGRAM(S): 0.4 CAPSULE ORAL at 22:22

## 2019-03-28 RX ADMIN — MORPHINE SULFATE 2 MILLIGRAM(S): 50 CAPSULE, EXTENDED RELEASE ORAL at 11:06

## 2019-03-28 RX ADMIN — OXYCODONE AND ACETAMINOPHEN 2 TABLET(S): 5; 325 TABLET ORAL at 08:01

## 2019-03-28 RX ADMIN — PANTOPRAZOLE SODIUM 40 MILLIGRAM(S): 20 TABLET, DELAYED RELEASE ORAL at 06:13

## 2019-03-28 RX ADMIN — MORPHINE SULFATE 2 MILLIGRAM(S): 50 CAPSULE, EXTENDED RELEASE ORAL at 11:27

## 2019-03-28 RX ADMIN — MORPHINE SULFATE 2 MILLIGRAM(S): 50 CAPSULE, EXTENDED RELEASE ORAL at 02:13

## 2019-03-28 RX ADMIN — Medication 1 TABLET(S): at 11:04

## 2019-03-28 RX ADMIN — Medication 80 MILLIGRAM(S): at 06:13

## 2019-03-28 RX ADMIN — OXYCODONE AND ACETAMINOPHEN 2 TABLET(S): 5; 325 TABLET ORAL at 03:26

## 2019-03-28 RX ADMIN — HEPARIN SODIUM 5000 UNIT(S): 5000 INJECTION INTRAVENOUS; SUBCUTANEOUS at 22:21

## 2019-03-28 RX ADMIN — MORPHINE SULFATE 2 MILLIGRAM(S): 50 CAPSULE, EXTENDED RELEASE ORAL at 16:44

## 2019-03-28 RX ADMIN — OXYCODONE AND ACETAMINOPHEN 2 TABLET(S): 5; 325 TABLET ORAL at 13:17

## 2019-03-28 RX ADMIN — OXYCODONE AND ACETAMINOPHEN 2 TABLET(S): 5; 325 TABLET ORAL at 12:05

## 2019-03-28 RX ADMIN — Medication 100 MILLIGRAM(S): at 22:22

## 2019-03-28 RX ADMIN — HEPARIN SODIUM 5000 UNIT(S): 5000 INJECTION INTRAVENOUS; SUBCUTANEOUS at 06:13

## 2019-03-28 RX ADMIN — HEPARIN SODIUM 5000 UNIT(S): 5000 INJECTION INTRAVENOUS; SUBCUTANEOUS at 13:15

## 2019-03-28 RX ADMIN — METHOCARBAMOL 750 MILLIGRAM(S): 500 TABLET, FILM COATED ORAL at 13:15

## 2019-03-28 RX ADMIN — Medication 100 MILLIGRAM(S): at 13:15

## 2019-03-28 RX ADMIN — OXYCODONE AND ACETAMINOPHEN 2 TABLET(S): 5; 325 TABLET ORAL at 09:37

## 2019-03-28 NOTE — PROGRESS NOTE ADULT - SUBJECTIVE AND OBJECTIVE BOX
POD # 3    S/P Placement of Spinal Cord Stimulator    Pt seen and examined at bedside. Pt c/o incisional pain. Denies lower extremity pain.    Vital Signs Last 24 Hrs  T(C): 36 (28 Mar 2019 05:34), Max: 36.7 (27 Mar 2019 21:44)  T(F): 96.8 (28 Mar 2019 05:34), Max: 98 (27 Mar 2019 21:44)  HR: 71 (28 Mar 2019 05:34) (70 - 71)  BP: 127/73 (28 Mar 2019 05:34) (127/73 - 137/76)  BP(mean): --  RR: 20 (28 Mar 2019 05:34) (18 - 20)  SpO2: --    PHYSICAL EXAM:  Strength 5/5  + Sensation to light touch  Incision intact    MEDICATIONS:  Antibiotics:    Neuro:  acetaminophen   Tablet .. 650 milliGRAM(s) Oral every 6 hours PRN  clonazePAM Tablet 1 milliGRAM(s) Oral every 12 hours PRN  lurasidone 60 milliGRAM(s) Oral daily  methocarbamol 750 milliGRAM(s) Oral every 8 hours  morphine  - Injectable 2 milliGRAM(s) IV Push every 4 hours PRN  ondansetron Injectable 4 milliGRAM(s) IV Push every 6 hours PRN  oxyCODONE    5 mG/acetaminophen 325 mG 1 Tablet(s) Oral every 4 hours PRN  oxyCODONE    5 mG/acetaminophen 325 mG 2 Tablet(s) Oral every 4 hours PRN  sertraline 200 milliGRAM(s) Oral daily    Anticoagulation:  heparin  Injectable 5000 Unit(s) SubCutaneous every 8 hours    OTHER:  docusate sodium 100 milliGRAM(s) Oral three times a day  furosemide    Tablet 80 milliGRAM(s) Oral daily  pantoprazole    Tablet 40 milliGRAM(s) Oral before breakfast  senna 2 Tablet(s) Oral at bedtime PRN  tamsulosin 0.4 milliGRAM(s) Oral at bedtime    IVF:  multivitamin 1 Tablet(s) Oral daily        LABS:                        13.6   11.95 )-----------( 142      ( 27 Mar 2019 11:14 )             41.1     Assessment:  As above    Plan:  Pain Meds PRN  SW for D/C Planning

## 2019-03-29 PROCEDURE — 93970 EXTREMITY STUDY: CPT | Mod: 26

## 2019-03-29 RX ORDER — LIDOCAINE 4 G/100G
1 CREAM TOPICAL EVERY 24 HOURS
Qty: 0 | Refills: 0 | Status: DISCONTINUED | OUTPATIENT
Start: 2019-03-29 | End: 2019-04-04

## 2019-03-29 RX ADMIN — OXYCODONE AND ACETAMINOPHEN 2 TABLET(S): 5; 325 TABLET ORAL at 16:43

## 2019-03-29 RX ADMIN — Medication 80 MILLIGRAM(S): at 05:33

## 2019-03-29 RX ADMIN — OXYCODONE AND ACETAMINOPHEN 2 TABLET(S): 5; 325 TABLET ORAL at 23:02

## 2019-03-29 RX ADMIN — METHOCARBAMOL 750 MILLIGRAM(S): 500 TABLET, FILM COATED ORAL at 21:19

## 2019-03-29 RX ADMIN — OXYCODONE AND ACETAMINOPHEN 2 TABLET(S): 5; 325 TABLET ORAL at 12:08

## 2019-03-29 RX ADMIN — OXYCODONE AND ACETAMINOPHEN 2 TABLET(S): 5; 325 TABLET ORAL at 00:06

## 2019-03-29 RX ADMIN — METHOCARBAMOL 750 MILLIGRAM(S): 500 TABLET, FILM COATED ORAL at 05:33

## 2019-03-29 RX ADMIN — MORPHINE SULFATE 2 MILLIGRAM(S): 50 CAPSULE, EXTENDED RELEASE ORAL at 19:46

## 2019-03-29 RX ADMIN — MORPHINE SULFATE 2 MILLIGRAM(S): 50 CAPSULE, EXTENDED RELEASE ORAL at 10:07

## 2019-03-29 RX ADMIN — Medication 1 MILLIGRAM(S): at 00:06

## 2019-03-29 RX ADMIN — LIDOCAINE 1 PATCH: 4 CREAM TOPICAL at 12:05

## 2019-03-29 RX ADMIN — TAMSULOSIN HYDROCHLORIDE 0.4 MILLIGRAM(S): 0.4 CAPSULE ORAL at 21:19

## 2019-03-29 RX ADMIN — SERTRALINE 200 MILLIGRAM(S): 25 TABLET, FILM COATED ORAL at 11:35

## 2019-03-29 RX ADMIN — MORPHINE SULFATE 2 MILLIGRAM(S): 50 CAPSULE, EXTENDED RELEASE ORAL at 14:35

## 2019-03-29 RX ADMIN — Medication 100 MILLIGRAM(S): at 21:19

## 2019-03-29 RX ADMIN — METHOCARBAMOL 750 MILLIGRAM(S): 500 TABLET, FILM COATED ORAL at 14:27

## 2019-03-29 RX ADMIN — HEPARIN SODIUM 5000 UNIT(S): 5000 INJECTION INTRAVENOUS; SUBCUTANEOUS at 21:20

## 2019-03-29 RX ADMIN — Medication 1 TABLET(S): at 11:35

## 2019-03-29 RX ADMIN — HEPARIN SODIUM 5000 UNIT(S): 5000 INJECTION INTRAVENOUS; SUBCUTANEOUS at 05:34

## 2019-03-29 RX ADMIN — SENNA PLUS 2 TABLET(S): 8.6 TABLET ORAL at 14:32

## 2019-03-29 RX ADMIN — OXYCODONE AND ACETAMINOPHEN 2 TABLET(S): 5; 325 TABLET ORAL at 08:32

## 2019-03-29 RX ADMIN — Medication 100 MILLIGRAM(S): at 05:33

## 2019-03-29 RX ADMIN — LURASIDONE HYDROCHLORIDE 60 MILLIGRAM(S): 40 TABLET ORAL at 14:50

## 2019-03-29 RX ADMIN — Medication 1 MILLIGRAM(S): at 12:07

## 2019-03-29 RX ADMIN — Medication 100 MILLIGRAM(S): at 14:27

## 2019-03-29 RX ADMIN — PANTOPRAZOLE SODIUM 40 MILLIGRAM(S): 20 TABLET, DELAYED RELEASE ORAL at 08:32

## 2019-03-29 RX ADMIN — MORPHINE SULFATE 2 MILLIGRAM(S): 50 CAPSULE, EXTENDED RELEASE ORAL at 05:46

## 2019-03-29 RX ADMIN — HEPARIN SODIUM 5000 UNIT(S): 5000 INJECTION INTRAVENOUS; SUBCUTANEOUS at 14:27

## 2019-03-29 NOTE — PROGRESS NOTE ADULT - SUBJECTIVE AND OBJECTIVE BOX
POD# 4    S/P Placement of Spinal Cord Stimulator    Pt seen and examined at bedside. Pt c/o incisional pain at this time. c/o some burning in his Lateral right foot. Denies radiculopathy, parasthesias.     Vital Signs Last 24 Hrs  T(C): 35.8 (29 Mar 2019 07:03), Max: 36.6 (28 Mar 2019 13:49)  T(F): 96.4 (29 Mar 2019 07:03), Max: 97.9 (28 Mar 2019 13:49)  HR: 70 (29 Mar 2019 07:03) (69 - 70)  BP: 116/78 (29 Mar 2019 07:03) (114/71 - 126/78)  BP(mean): --  RR: 18 (29 Mar 2019 07:03) (18 - 20)  SpO2: --    PHYSICAL EXAM:  Strength 5/5  Sensation increased to light touch Right foot compared to left  Incision intact    MEDICATIONS:  Antibiotics:    Neuro:  acetaminophen   Tablet .. 650 milliGRAM(s) Oral every 6 hours PRN  clonazePAM Tablet 1 milliGRAM(s) Oral every 12 hours PRN  lurasidone 60 milliGRAM(s) Oral daily  methocarbamol 750 milliGRAM(s) Oral every 8 hours  morphine  - Injectable 2 milliGRAM(s) IV Push every 4 hours PRN  ondansetron Injectable 4 milliGRAM(s) IV Push every 6 hours PRN  oxyCODONE    5 mG/acetaminophen 325 mG 1 Tablet(s) Oral every 4 hours PRN  oxyCODONE    5 mG/acetaminophen 325 mG 2 Tablet(s) Oral every 4 hours PRN  sertraline 200 milliGRAM(s) Oral daily    Anticoagulation:  heparin  Injectable 5000 Unit(s) SubCutaneous every 8 hours    OTHER:  docusate sodium 100 milliGRAM(s) Oral three times a day  furosemide    Tablet 80 milliGRAM(s) Oral daily  pantoprazole    Tablet 40 milliGRAM(s) Oral before breakfast  senna 2 Tablet(s) Oral at bedtime PRN  tamsulosin 0.4 milliGRAM(s) Oral at bedtime    IVF:  multivitamin 1 Tablet(s) Oral daily    Assessment:  As above    Plan:  Lidoderm patch  Xray of Right foot  Venous Dopplers

## 2019-03-30 LAB
ANION GAP SERPL CALC-SCNC: 15 MMOL/L — HIGH (ref 7–14)
BUN SERPL-MCNC: 19 MG/DL — SIGNIFICANT CHANGE UP (ref 10–20)
CALCIUM SERPL-MCNC: 9.3 MG/DL — SIGNIFICANT CHANGE UP (ref 8.5–10.1)
CHLORIDE SERPL-SCNC: 100 MMOL/L — SIGNIFICANT CHANGE UP (ref 98–110)
CO2 SERPL-SCNC: 29 MMOL/L — SIGNIFICANT CHANGE UP (ref 17–32)
CREAT SERPL-MCNC: 1 MG/DL — SIGNIFICANT CHANGE UP (ref 0.7–1.5)
GLUCOSE SERPL-MCNC: 154 MG/DL — HIGH (ref 70–99)
MAGNESIUM SERPL-MCNC: 1.9 MG/DL — SIGNIFICANT CHANGE UP (ref 1.8–2.4)
POTASSIUM SERPL-MCNC: 4.6 MMOL/L — SIGNIFICANT CHANGE UP (ref 3.5–5)
POTASSIUM SERPL-SCNC: 4.6 MMOL/L — SIGNIFICANT CHANGE UP (ref 3.5–5)
SODIUM SERPL-SCNC: 144 MMOL/L — SIGNIFICANT CHANGE UP (ref 135–146)

## 2019-03-30 RX ORDER — POLYETHYLENE GLYCOL 3350 17 G/17G
17 POWDER, FOR SOLUTION ORAL DAILY
Qty: 0 | Refills: 0 | Status: DISCONTINUED | OUTPATIENT
Start: 2019-03-30 | End: 2019-04-04

## 2019-03-30 RX ORDER — SENNA PLUS 8.6 MG/1
2 TABLET ORAL AT BEDTIME
Qty: 0 | Refills: 0 | Status: DISCONTINUED | OUTPATIENT
Start: 2019-03-30 | End: 2019-04-04

## 2019-03-30 RX ORDER — APIXABAN 2.5 MG/1
5 TABLET, FILM COATED ORAL EVERY 12 HOURS
Qty: 0 | Refills: 0 | Status: DISCONTINUED | OUTPATIENT
Start: 2019-03-30 | End: 2019-04-04

## 2019-03-30 RX ADMIN — METHOCARBAMOL 750 MILLIGRAM(S): 500 TABLET, FILM COATED ORAL at 13:15

## 2019-03-30 RX ADMIN — Medication 1 MILLIGRAM(S): at 13:15

## 2019-03-30 RX ADMIN — OXYCODONE AND ACETAMINOPHEN 2 TABLET(S): 5; 325 TABLET ORAL at 18:35

## 2019-03-30 RX ADMIN — MORPHINE SULFATE 2 MILLIGRAM(S): 50 CAPSULE, EXTENDED RELEASE ORAL at 01:04

## 2019-03-30 RX ADMIN — OXYCODONE AND ACETAMINOPHEN 2 TABLET(S): 5; 325 TABLET ORAL at 08:50

## 2019-03-30 RX ADMIN — Medication 1 TABLET(S): at 12:40

## 2019-03-30 RX ADMIN — Medication 100 MILLIGRAM(S): at 21:12

## 2019-03-30 RX ADMIN — Medication 100 MILLIGRAM(S): at 13:16

## 2019-03-30 RX ADMIN — Medication 1 MILLIGRAM(S): at 23:15

## 2019-03-30 RX ADMIN — SENNA PLUS 2 TABLET(S): 8.6 TABLET ORAL at 21:12

## 2019-03-30 RX ADMIN — SERTRALINE 200 MILLIGRAM(S): 25 TABLET, FILM COATED ORAL at 12:40

## 2019-03-30 RX ADMIN — OXYCODONE AND ACETAMINOPHEN 2 TABLET(S): 5; 325 TABLET ORAL at 18:17

## 2019-03-30 RX ADMIN — Medication 80 MILLIGRAM(S): at 05:44

## 2019-03-30 RX ADMIN — HEPARIN SODIUM 5000 UNIT(S): 5000 INJECTION INTRAVENOUS; SUBCUTANEOUS at 13:16

## 2019-03-30 RX ADMIN — OXYCODONE AND ACETAMINOPHEN 2 TABLET(S): 5; 325 TABLET ORAL at 08:37

## 2019-03-30 RX ADMIN — OXYCODONE AND ACETAMINOPHEN 2 TABLET(S): 5; 325 TABLET ORAL at 13:45

## 2019-03-30 RX ADMIN — MORPHINE SULFATE 2 MILLIGRAM(S): 50 CAPSULE, EXTENDED RELEASE ORAL at 21:13

## 2019-03-30 RX ADMIN — MORPHINE SULFATE 2 MILLIGRAM(S): 50 CAPSULE, EXTENDED RELEASE ORAL at 20:17

## 2019-03-30 RX ADMIN — PANTOPRAZOLE SODIUM 40 MILLIGRAM(S): 20 TABLET, DELAYED RELEASE ORAL at 05:44

## 2019-03-30 RX ADMIN — HEPARIN SODIUM 5000 UNIT(S): 5000 INJECTION INTRAVENOUS; SUBCUTANEOUS at 05:45

## 2019-03-30 RX ADMIN — TAMSULOSIN HYDROCHLORIDE 0.4 MILLIGRAM(S): 0.4 CAPSULE ORAL at 21:12

## 2019-03-30 RX ADMIN — OXYCODONE AND ACETAMINOPHEN 2 TABLET(S): 5; 325 TABLET ORAL at 03:13

## 2019-03-30 RX ADMIN — Medication 100 MILLIGRAM(S): at 05:44

## 2019-03-30 RX ADMIN — METHOCARBAMOL 750 MILLIGRAM(S): 500 TABLET, FILM COATED ORAL at 05:44

## 2019-03-30 RX ADMIN — LURASIDONE HYDROCHLORIDE 60 MILLIGRAM(S): 40 TABLET ORAL at 12:43

## 2019-03-30 RX ADMIN — POLYETHYLENE GLYCOL 3350 17 GRAM(S): 17 POWDER, FOR SOLUTION ORAL at 13:04

## 2019-03-30 RX ADMIN — OXYCODONE AND ACETAMINOPHEN 2 TABLET(S): 5; 325 TABLET ORAL at 22:29

## 2019-03-30 RX ADMIN — METHOCARBAMOL 750 MILLIGRAM(S): 500 TABLET, FILM COATED ORAL at 21:12

## 2019-03-30 RX ADMIN — MORPHINE SULFATE 2 MILLIGRAM(S): 50 CAPSULE, EXTENDED RELEASE ORAL at 16:10

## 2019-03-30 RX ADMIN — MORPHINE SULFATE 2 MILLIGRAM(S): 50 CAPSULE, EXTENDED RELEASE ORAL at 05:52

## 2019-03-30 RX ADMIN — OXYCODONE AND ACETAMINOPHEN 2 TABLET(S): 5; 325 TABLET ORAL at 13:03

## 2019-03-30 NOTE — PROGRESS NOTE ADULT - SUBJECTIVE AND OBJECTIVE BOX
Subjective: 49yMale with a pmhx of M54.16, 56698, 25230, 99060/CONVERSIONS  M54.16, 14219, 39671, 57352  ^M54.16, 12931, 60090, 72706/CONVERSIONS  MEWS Score  GERD (gastroesophageal reflux disease)  Anxiety and depression  Bipolar 1 disorder  AICD present, double chamber  Prostate cancer  BPH (benign prostatic hyperplasia)  CHF (congestive heart failure)  MI, old  Thoracic compression fracture  Lumbago  Thoracic compression fracture  Lumbago  Laminectomy, spine, thoracic, with spinal cord stimulator insertion  S/P insertion of spinal cord stimulator  History of implantable cardioverter-defibrillator (ICD) placement  FHx: prostate cancer      POD# 5    S/P Placement of Spinal Cord Stimulator    Pt seen and examined at bedside. Pt c/o incisional pain. c/o   Pt denies radiculopathy or  parasthesias.  c/o constipation.    Allergies    No Known Allergies    Intolerances        Vital Signs Last 24 Hrs  T(C): 36 (30 Mar 2019 05:58), Max: 36.1 (29 Mar 2019 13:58)  T(F): 96.8 (30 Mar 2019 05:58), Max: 97 (29 Mar 2019 13:58)  HR: 70 (30 Mar 2019 05:58) (70 - 70)  BP: 129/81 (30 Mar 2019 05:58) (116/73 - 129/81)  BP(mean): 73 (29 Mar 2019 13:58) (73 - 73)  RR: 18 (30 Mar 2019 05:58) (18 - 18)  SpO2: --      acetaminophen   Tablet .. 650 milliGRAM(s) Oral every 6 hours PRN  bisacodyl Suppository 10 milliGRAM(s) Rectal daily PRN  clonazePAM Tablet 1 milliGRAM(s) Oral every 12 hours PRN  docusate sodium 100 milliGRAM(s) Oral three times a day  furosemide    Tablet 80 milliGRAM(s) Oral daily  heparin  Injectable 5000 Unit(s) SubCutaneous every 8 hours  lidocaine   Patch 1 Patch Transdermal every 24 hours  lurasidone 60 milliGRAM(s) Oral daily  methocarbamol 750 milliGRAM(s) Oral every 8 hours  morphine  - Injectable 2 milliGRAM(s) IV Push every 4 hours PRN  multivitamin 1 Tablet(s) Oral daily  ondansetron Injectable 4 milliGRAM(s) IV Push every 6 hours PRN  oxyCODONE    5 mG/acetaminophen 325 mG 1 Tablet(s) Oral every 4 hours PRN  oxyCODONE    5 mG/acetaminophen 325 mG 2 Tablet(s) Oral every 4 hours PRN  pantoprazole    Tablet 40 milliGRAM(s) Oral before breakfast  polyethylene glycol 3350 17 Gram(s) Oral daily PRN  senna 2 Tablet(s) Oral at bedtime  sertraline 200 milliGRAM(s) Oral daily  tamsulosin 0.4 milliGRAM(s) Oral at bedtime        03-29-19 @ 07:01  -  03-30-19 @ 07:00  --------------------------------------------------------  IN: 0 mL / OUT: 1200 mL / NET: -1200 mL    03-30-19 @ 07:01  -  03-30-19 @ 09:02  --------------------------------------------------------  IN: 0 mL / OUT: 300 mL / NET: -300 mL          Exam:  AAOX3. Verbal function intact  follows commands  Motor: MAEx4  5/5 power in b/l LE  Sensation: intact R>L on foot        Wound: sutures x 2 intact  no drainage  DSD applied    Imaging:  < from: [facility] Duplex Lower Ext Vein Scan, Bilat (03.29.19 @ 17:12) >    Impression:    No evidence of deep venous thrombosis or superficial thrombophlebitis in   bilateral lower extremities.    < end of copied text >      Assessment/Plan: as above  DVT sono negative  f/u final foot xray reading  start Miralax PRN  Dulcolax supp PRN  discuss resuming pts home Eliquis w attg  PT/rehab  Social work for placement  will discuss w attg

## 2019-03-31 RX ORDER — CLONAZEPAM 1 MG
1 TABLET ORAL EVERY 12 HOURS
Qty: 0 | Refills: 0 | Status: DISCONTINUED | OUTPATIENT
Start: 2019-03-31 | End: 2019-04-04

## 2019-03-31 RX ADMIN — OXYCODONE AND ACETAMINOPHEN 2 TABLET(S): 5; 325 TABLET ORAL at 23:03

## 2019-03-31 RX ADMIN — MORPHINE SULFATE 2 MILLIGRAM(S): 50 CAPSULE, EXTENDED RELEASE ORAL at 00:31

## 2019-03-31 RX ADMIN — Medication 100 MILLIGRAM(S): at 21:08

## 2019-03-31 RX ADMIN — PANTOPRAZOLE SODIUM 40 MILLIGRAM(S): 20 TABLET, DELAYED RELEASE ORAL at 05:21

## 2019-03-31 RX ADMIN — APIXABAN 5 MILLIGRAM(S): 2.5 TABLET, FILM COATED ORAL at 05:21

## 2019-03-31 RX ADMIN — METHOCARBAMOL 750 MILLIGRAM(S): 500 TABLET, FILM COATED ORAL at 05:21

## 2019-03-31 RX ADMIN — OXYCODONE AND ACETAMINOPHEN 2 TABLET(S): 5; 325 TABLET ORAL at 03:05

## 2019-03-31 RX ADMIN — Medication 1 MILLIGRAM(S): at 21:10

## 2019-03-31 RX ADMIN — Medication 80 MILLIGRAM(S): at 05:21

## 2019-03-31 RX ADMIN — Medication 1 MILLIGRAM(S): at 09:16

## 2019-03-31 RX ADMIN — MORPHINE SULFATE 2 MILLIGRAM(S): 50 CAPSULE, EXTENDED RELEASE ORAL at 23:10

## 2019-03-31 RX ADMIN — METHOCARBAMOL 750 MILLIGRAM(S): 500 TABLET, FILM COATED ORAL at 21:08

## 2019-03-31 RX ADMIN — OXYCODONE AND ACETAMINOPHEN 2 TABLET(S): 5; 325 TABLET ORAL at 09:16

## 2019-03-31 RX ADMIN — OXYCODONE AND ACETAMINOPHEN 2 TABLET(S): 5; 325 TABLET ORAL at 14:15

## 2019-03-31 RX ADMIN — TAMSULOSIN HYDROCHLORIDE 0.4 MILLIGRAM(S): 0.4 CAPSULE ORAL at 21:08

## 2019-03-31 RX ADMIN — OXYCODONE AND ACETAMINOPHEN 2 TABLET(S): 5; 325 TABLET ORAL at 04:46

## 2019-03-31 RX ADMIN — OXYCODONE AND ACETAMINOPHEN 2 TABLET(S): 5; 325 TABLET ORAL at 09:15

## 2019-03-31 RX ADMIN — OXYCODONE AND ACETAMINOPHEN 2 TABLET(S): 5; 325 TABLET ORAL at 20:22

## 2019-03-31 RX ADMIN — Medication 100 MILLIGRAM(S): at 14:39

## 2019-03-31 RX ADMIN — LURASIDONE HYDROCHLORIDE 60 MILLIGRAM(S): 40 TABLET ORAL at 11:39

## 2019-03-31 RX ADMIN — MORPHINE SULFATE 2 MILLIGRAM(S): 50 CAPSULE, EXTENDED RELEASE ORAL at 17:15

## 2019-03-31 RX ADMIN — OXYCODONE AND ACETAMINOPHEN 2 TABLET(S): 5; 325 TABLET ORAL at 00:29

## 2019-03-31 RX ADMIN — MORPHINE SULFATE 2 MILLIGRAM(S): 50 CAPSULE, EXTENDED RELEASE ORAL at 05:23

## 2019-03-31 RX ADMIN — MORPHINE SULFATE 2 MILLIGRAM(S): 50 CAPSULE, EXTENDED RELEASE ORAL at 01:52

## 2019-03-31 RX ADMIN — METHOCARBAMOL 750 MILLIGRAM(S): 500 TABLET, FILM COATED ORAL at 14:39

## 2019-03-31 RX ADMIN — SERTRALINE 200 MILLIGRAM(S): 25 TABLET, FILM COATED ORAL at 11:38

## 2019-03-31 RX ADMIN — Medication 100 MILLIGRAM(S): at 05:21

## 2019-03-31 RX ADMIN — MORPHINE SULFATE 2 MILLIGRAM(S): 50 CAPSULE, EXTENDED RELEASE ORAL at 17:36

## 2019-03-31 RX ADMIN — Medication 1 TABLET(S): at 11:38

## 2019-03-31 RX ADMIN — MORPHINE SULFATE 2 MILLIGRAM(S): 50 CAPSULE, EXTENDED RELEASE ORAL at 23:09

## 2019-03-31 RX ADMIN — SENNA PLUS 2 TABLET(S): 8.6 TABLET ORAL at 21:08

## 2019-03-31 RX ADMIN — OXYCODONE AND ACETAMINOPHEN 2 TABLET(S): 5; 325 TABLET ORAL at 14:39

## 2019-03-31 NOTE — CONSULT NOTE ADULT - SUBJECTIVE AND OBJECTIVE BOX
Orthopedic Consult  CC: Asked to evaluate R TOE PROXIMAL PHALANX fracture by NEUROSURGERY service.    HPI:  49y Male patient ADMITTED to [facility] on NSG service, s/p thoracic laminectomy and spinal cord stimulator implantation. Reported he heard a pop located to his R foot when he was getting up from a chair on Thursday. Denies fall, LOC. Denies numbness or tingling. Endorses pain to the dorsum of R foot. Denies pain anywhere else. Patient stated he has been improving every day, is able to ambulate with minimal pain. Radiographs were attained, questionable fracture 3rd proximal phalanx.    Review of Symptoms: No recent fevers, night sweats or other constitutional symptoms.  No unexplained weight loss, weight gain, bowl or bladder disturbance.    PMedHx and PsurgHx: M54.16, 64668, 90207, 52451/CONVERSIONS  M54.16, 83080, 97899, 04301  ^M54.16, 74905, 35082, 38933/CONVERSIONS  MEWS Score  GERD (gastroesophageal reflux disease)  Anxiety and depression  Bipolar 1 disorder  AICD present, double chamber  Prostate cancer  BPH (benign prostatic hyperplasia)  CHF (congestive heart failure)  MI, old  Thoracic compression fracture  Lumbago  Thoracic compression fracture  Lumbago  Laminectomy, spine, thoracic, with spinal cord stimulator insertion  S/P insertion of spinal cord stimulator  History of implantable cardioverter-defibrillator (ICD) placement  FHx: prostate cancer    Medications: acetaminophen   Tablet .. 650 milliGRAM(s) Oral every 6 hours PRN  apixaban 5 milliGRAM(s) Oral every 12 hours  bisacodyl Suppository 10 milliGRAM(s) Rectal daily PRN  clonazePAM Tablet 1 milliGRAM(s) Oral every 12 hours  docusate sodium 100 milliGRAM(s) Oral three times a day  furosemide    Tablet 80 milliGRAM(s) Oral daily  lidocaine   Patch 1 Patch Transdermal every 24 hours  lurasidone 60 milliGRAM(s) Oral daily  methocarbamol 750 milliGRAM(s) Oral every 8 hours  morphine  - Injectable 2 milliGRAM(s) IV Push every 4 hours PRN  multivitamin 1 Tablet(s) Oral daily  ondansetron Injectable 4 milliGRAM(s) IV Push every 6 hours PRN  oxyCODONE    5 mG/acetaminophen 325 mG 1 Tablet(s) Oral every 4 hours PRN  oxyCODONE    5 mG/acetaminophen 325 mG 2 Tablet(s) Oral every 4 hours PRN  pantoprazole    Tablet 40 milliGRAM(s) Oral before breakfast  polyethylene glycol 3350 17 Gram(s) Oral daily PRN  senna 2 Tablet(s) Oral at bedtime  sertraline 200 milliGRAM(s) Oral daily  tamsulosin 0.4 milliGRAM(s) Oral at bedtime    Allergies: No Known Allergies      Physical Exam:    R Foot    Skin intact no deformity noted  TTP dorsum of R 3rd toe, minimal  Able to walk  SILT dp/sp/s/s  Motor intact EHL/FHL/TA  WWP    Laboratory:      03-30    144  |  100  |  19  ----------------------------<  154<H>  4.6   |  29  |  1.0    Ca    9.3      30 Mar 2019 17:34  Mg     1.9     03-30 03-30    144  |  100  |  19  ----------------------------<  154<H>  4.6   |  29  |  1.0    Ca    9.3      30 Mar 2019 17:34  Mg     1.9     03-30          Radiographs  R FOOT (AP, Lateral): No visible fracture.     Impression and Plan: 49y Male patient with R 3rd toe pain, low suspicion for acute fracture  - Consider Hard sole shoe for comfort  - WBAT  - Pain control per primary  - Will d/w attending to sign off the final plan.

## 2019-03-31 NOTE — PROGRESS NOTE ADULT - SUBJECTIVE AND OBJECTIVE BOX
Subjective: 49yMale with a pmhx of M54.16, 03832, 51945, 75137/CONVERSIONS  M54.16, 12384, 02709, 97691  ^M54.16, 53021, 94599, 98352/CONVERSIONS  MEWS Score  GERD (gastroesophageal reflux disease)  Anxiety and depression  Bipolar 1 disorder  AICD present, double chamber  Prostate cancer  BPH (benign prostatic hyperplasia)  CHF (congestive heart failure)  MI, old  Thoracic compression fracture  Lumbago  Thoracic compression fracture  Lumbago  Laminectomy, spine, thoracic, with spinal cord stimulator insertion  S/P insertion of spinal cord stimulator  History of implantable cardioverter-defibrillator (ICD) placement  FHx: prostate cancer      POD# 6    S/P Placement of Spinal Cord Stimulator    Pt seen and examined at bedside.   Pt denies radiculopathy or  parasthesias.  Restarted on Eliquis.      Allergies    No Known Allergies    Intolerances        Vital Signs Last 24 Hrs  T(C): 35.2 (31 Mar 2019 05:33), Max: 36.7 (30 Mar 2019 14:07)  T(F): 95.4 (31 Mar 2019 05:33), Max: 98 (30 Mar 2019 14:07)  HR: 70 (31 Mar 2019 05:33) (70 - 70)  BP: 134/91 (31 Mar 2019 05:33) (121/82 - 147/94)  BP(mean): --  RR: 20 (31 Mar 2019 05:33) (19 - 20)  SpO2: --      acetaminophen   Tablet .. 650 milliGRAM(s) Oral every 6 hours PRN  apixaban 5 milliGRAM(s) Oral every 12 hours  bisacodyl Suppository 10 milliGRAM(s) Rectal daily PRN  clonazePAM Tablet 1 milliGRAM(s) Oral every 12 hours  docusate sodium 100 milliGRAM(s) Oral three times a day  furosemide    Tablet 80 milliGRAM(s) Oral daily  lidocaine   Patch 1 Patch Transdermal every 24 hours  lurasidone 60 milliGRAM(s) Oral daily  methocarbamol 750 milliGRAM(s) Oral every 8 hours  morphine  - Injectable 2 milliGRAM(s) IV Push every 4 hours PRN  multivitamin 1 Tablet(s) Oral daily  ondansetron Injectable 4 milliGRAM(s) IV Push every 6 hours PRN  oxyCODONE    5 mG/acetaminophen 325 mG 1 Tablet(s) Oral every 4 hours PRN  oxyCODONE    5 mG/acetaminophen 325 mG 2 Tablet(s) Oral every 4 hours PRN  pantoprazole    Tablet 40 milliGRAM(s) Oral before breakfast  polyethylene glycol 3350 17 Gram(s) Oral daily PRN  senna 2 Tablet(s) Oral at bedtime  sertraline 200 milliGRAM(s) Oral daily  tamsulosin 0.4 milliGRAM(s) Oral at bedtime        03-30-19 @ 07:01  -  03-31-19 @ 07:00  --------------------------------------------------------  IN: 0 mL / OUT: 700 mL / NET: -700 mL            Exam:  AAOX3. Verbal function intact  follows commands  Motor: MAEx4  5/5 power in b/l LE  Sensation: intact R>L on foot      03-30    144  |  100  |  19  ----------------------------<  154<H>  4.6   |  29  |  1.0    Ca    9.3      30 Mar 2019 17:34  Mg     1.9     03-30        Imaging:  < from: Xray Foot AP + Lateral + Oblique, Right (03.29.19 @ 13:22) >    Impression:Apparent deformity at the distal aspect of third proximal   phalangeal head suggests age indeterminant fracture versus artifact of   rotation. Consider dedicated right third toe x-ray for complete   assessment.    < end of copied text >              Assessment/Plan: as above  pain control  PT/rehab  right third toe xray  change Klonopin to ATC as pt takes at home  will discuss w attg

## 2019-04-01 RX ADMIN — OXYCODONE AND ACETAMINOPHEN 2 TABLET(S): 5; 325 TABLET ORAL at 03:07

## 2019-04-01 RX ADMIN — Medication 100 MILLIGRAM(S): at 05:09

## 2019-04-01 RX ADMIN — SENNA PLUS 2 TABLET(S): 8.6 TABLET ORAL at 21:27

## 2019-04-01 RX ADMIN — Medication 1 MILLIGRAM(S): at 21:27

## 2019-04-01 RX ADMIN — Medication 80 MILLIGRAM(S): at 05:09

## 2019-04-01 RX ADMIN — OXYCODONE AND ACETAMINOPHEN 2 TABLET(S): 5; 325 TABLET ORAL at 16:43

## 2019-04-01 RX ADMIN — METHOCARBAMOL 750 MILLIGRAM(S): 500 TABLET, FILM COATED ORAL at 21:28

## 2019-04-01 RX ADMIN — LURASIDONE HYDROCHLORIDE 60 MILLIGRAM(S): 40 TABLET ORAL at 11:28

## 2019-04-01 RX ADMIN — TAMSULOSIN HYDROCHLORIDE 0.4 MILLIGRAM(S): 0.4 CAPSULE ORAL at 21:27

## 2019-04-01 RX ADMIN — PANTOPRAZOLE SODIUM 40 MILLIGRAM(S): 20 TABLET, DELAYED RELEASE ORAL at 05:09

## 2019-04-01 RX ADMIN — METHOCARBAMOL 750 MILLIGRAM(S): 500 TABLET, FILM COATED ORAL at 05:09

## 2019-04-01 RX ADMIN — Medication 100 MILLIGRAM(S): at 13:24

## 2019-04-01 RX ADMIN — OXYCODONE AND ACETAMINOPHEN 2 TABLET(S): 5; 325 TABLET ORAL at 17:43

## 2019-04-01 RX ADMIN — OXYCODONE AND ACETAMINOPHEN 2 TABLET(S): 5; 325 TABLET ORAL at 21:27

## 2019-04-01 RX ADMIN — OXYCODONE AND ACETAMINOPHEN 2 TABLET(S): 5; 325 TABLET ORAL at 05:08

## 2019-04-01 RX ADMIN — OXYCODONE AND ACETAMINOPHEN 2 TABLET(S): 5; 325 TABLET ORAL at 23:00

## 2019-04-01 RX ADMIN — APIXABAN 5 MILLIGRAM(S): 2.5 TABLET, FILM COATED ORAL at 05:09

## 2019-04-01 RX ADMIN — Medication 1 MILLIGRAM(S): at 08:41

## 2019-04-01 RX ADMIN — MORPHINE SULFATE 2 MILLIGRAM(S): 50 CAPSULE, EXTENDED RELEASE ORAL at 01:53

## 2019-04-01 RX ADMIN — Medication 1 TABLET(S): at 11:27

## 2019-04-01 RX ADMIN — METHOCARBAMOL 750 MILLIGRAM(S): 500 TABLET, FILM COATED ORAL at 13:24

## 2019-04-01 RX ADMIN — SERTRALINE 200 MILLIGRAM(S): 25 TABLET, FILM COATED ORAL at 11:29

## 2019-04-01 RX ADMIN — APIXABAN 5 MILLIGRAM(S): 2.5 TABLET, FILM COATED ORAL at 17:43

## 2019-04-01 RX ADMIN — OXYCODONE AND ACETAMINOPHEN 2 TABLET(S): 5; 325 TABLET ORAL at 08:42

## 2019-04-01 RX ADMIN — Medication 100 MILLIGRAM(S): at 21:28

## 2019-04-01 NOTE — DIETITIAN INITIAL EVALUATION ADULT. - ENERGY NEEDS
Calories: 0794-4198 kcals/day (MSJ x 1.1-1.2)  Protein: 75-91 g/day (1-1.2 g/kg IBW)  Fluids: 1ml/kcal

## 2019-04-01 NOTE — DIETITIAN INITIAL EVALUATION ADULT. - OTHER INFO
RD assessment as LOS. Pt s/p spinal cord stimulator reports good see/PO intake, consuming >75% of meals. NKFA. + BM 3/31

## 2019-04-01 NOTE — PROGRESS NOTE ADULT - SUBJECTIVE AND OBJECTIVE BOX
POD# 7    S/P Placement of Spinal Cord Stimulator    Pt seen and examined at bedside. pt c/o incisional pain at this time.     Vital Signs Last 24 Hrs  T(C): 35.6 (01 Apr 2019 05:56), Max: 35.8 (31 Mar 2019 13:54)  T(F): 96.1 (01 Apr 2019 05:56), Max: 96.4 (31 Mar 2019 13:54)  HR: 70 (01 Apr 2019 05:56) (70 - 70)  BP: 123/77 (01 Apr 2019 05:56) (111/71 - 123/77)  BP(mean): --  RR: 18 (01 Apr 2019 05:56) (18 - 20)  SpO2: --    PHYSICAL EXAM:  Strength 5/5  Sensation intact to light touch proximally. Incr to right foot  Incisions intact      MEDICATIONS:  Antibiotics:    Neuro:  acetaminophen   Tablet .. 650 milliGRAM(s) Oral every 6 hours PRN  clonazePAM Tablet 1 milliGRAM(s) Oral every 12 hours  lurasidone 60 milliGRAM(s) Oral daily  methocarbamol 750 milliGRAM(s) Oral every 8 hours  morphine  - Injectable 2 milliGRAM(s) IV Push every 4 hours PRN  ondansetron Injectable 4 milliGRAM(s) IV Push every 6 hours PRN  oxyCODONE    5 mG/acetaminophen 325 mG 1 Tablet(s) Oral every 4 hours PRN  oxyCODONE    5 mG/acetaminophen 325 mG 2 Tablet(s) Oral every 4 hours PRN  sertraline 200 milliGRAM(s) Oral daily    Anticoagulation:  apixaban 5 milliGRAM(s) Oral every 12 hours    OTHER:  bisacodyl Suppository 10 milliGRAM(s) Rectal daily PRN  docusate sodium 100 milliGRAM(s) Oral three times a day  furosemide    Tablet 80 milliGRAM(s) Oral daily  lidocaine   Patch 1 Patch Transdermal every 24 hours  pantoprazole    Tablet 40 milliGRAM(s) Oral before breakfast  polyethylene glycol 3350 17 Gram(s) Oral daily PRN  senna 2 Tablet(s) Oral at bedtime  tamsulosin 0.4 milliGRAM(s) Oral at bedtime    IVF:  multivitamin 1 Tablet(s) Oral daily        LABS:    03-30    144  |  100  |  19  ----------------------------<  154<H>  4.6   |  29  |  1.0    Ca    9.3      30 Mar 2019 17:34  Mg     1.9     03-30    RADIOLOGY:    < from: Xray Toes, Right Foot (03.31.19 @ 12:13) >  Impression: Third proximal phalangeal nondisplaced neck subacute   impaction fracture with callus    < end of copied text >    Assessment:  As above    Plan:  Appreciate Ortho input.  SW for D/C Planning

## 2019-04-02 RX ORDER — OXYCODONE AND ACETAMINOPHEN 5; 325 MG/1; MG/1
2 TABLET ORAL EVERY 4 HOURS
Qty: 0 | Refills: 0 | Status: DISCONTINUED | OUTPATIENT
Start: 2019-04-02 | End: 2019-04-04

## 2019-04-02 RX ADMIN — OXYCODONE AND ACETAMINOPHEN 2 TABLET(S): 5; 325 TABLET ORAL at 01:44

## 2019-04-02 RX ADMIN — LURASIDONE HYDROCHLORIDE 60 MILLIGRAM(S): 40 TABLET ORAL at 11:45

## 2019-04-02 RX ADMIN — Medication 1 MILLIGRAM(S): at 10:32

## 2019-04-02 RX ADMIN — SENNA PLUS 2 TABLET(S): 8.6 TABLET ORAL at 21:28

## 2019-04-02 RX ADMIN — PANTOPRAZOLE SODIUM 40 MILLIGRAM(S): 20 TABLET, DELAYED RELEASE ORAL at 05:35

## 2019-04-02 RX ADMIN — Medication 1 TABLET(S): at 11:42

## 2019-04-02 RX ADMIN — Medication 1 MILLIGRAM(S): at 21:27

## 2019-04-02 RX ADMIN — APIXABAN 5 MILLIGRAM(S): 2.5 TABLET, FILM COATED ORAL at 05:35

## 2019-04-02 RX ADMIN — APIXABAN 5 MILLIGRAM(S): 2.5 TABLET, FILM COATED ORAL at 17:14

## 2019-04-02 RX ADMIN — Medication 100 MILLIGRAM(S): at 14:44

## 2019-04-02 RX ADMIN — OXYCODONE AND ACETAMINOPHEN 2 TABLET(S): 5; 325 TABLET ORAL at 15:26

## 2019-04-02 RX ADMIN — TAMSULOSIN HYDROCHLORIDE 0.4 MILLIGRAM(S): 0.4 CAPSULE ORAL at 21:28

## 2019-04-02 RX ADMIN — OXYCODONE AND ACETAMINOPHEN 2 TABLET(S): 5; 325 TABLET ORAL at 19:32

## 2019-04-02 RX ADMIN — METHOCARBAMOL 750 MILLIGRAM(S): 500 TABLET, FILM COATED ORAL at 05:36

## 2019-04-02 RX ADMIN — OXYCODONE AND ACETAMINOPHEN 2 TABLET(S): 5; 325 TABLET ORAL at 02:43

## 2019-04-02 RX ADMIN — OXYCODONE AND ACETAMINOPHEN 2 TABLET(S): 5; 325 TABLET ORAL at 10:30

## 2019-04-02 RX ADMIN — METHOCARBAMOL 750 MILLIGRAM(S): 500 TABLET, FILM COATED ORAL at 14:44

## 2019-04-02 RX ADMIN — OXYCODONE AND ACETAMINOPHEN 2 TABLET(S): 5; 325 TABLET ORAL at 14:56

## 2019-04-02 RX ADMIN — Medication 100 MILLIGRAM(S): at 21:28

## 2019-04-02 RX ADMIN — SERTRALINE 200 MILLIGRAM(S): 25 TABLET, FILM COATED ORAL at 11:42

## 2019-04-02 RX ADMIN — OXYCODONE AND ACETAMINOPHEN 2 TABLET(S): 5; 325 TABLET ORAL at 06:44

## 2019-04-02 RX ADMIN — Medication 100 MILLIGRAM(S): at 05:36

## 2019-04-02 RX ADMIN — Medication 80 MILLIGRAM(S): at 05:35

## 2019-04-02 RX ADMIN — METHOCARBAMOL 750 MILLIGRAM(S): 500 TABLET, FILM COATED ORAL at 21:28

## 2019-04-02 RX ADMIN — OXYCODONE AND ACETAMINOPHEN 2 TABLET(S): 5; 325 TABLET ORAL at 11:00

## 2019-04-02 RX ADMIN — OXYCODONE AND ACETAMINOPHEN 2 TABLET(S): 5; 325 TABLET ORAL at 06:00

## 2019-04-02 NOTE — PROGRESS NOTE ADULT - SUBJECTIVE AND OBJECTIVE BOX
Subjective: 49yMale with a pmhx of M54.16, 72803, 38245, 90599/CONVERSIONS  M54.16, 28853, 26183, 01304  ^M54.16, 55731, 21150, 06469/CONVERSIONS  MEWS Score  GERD (gastroesophageal reflux disease)  Anxiety and depression  Bipolar 1 disorder  AICD present, double chamber  Prostate cancer  BPH (benign prostatic hyperplasia)  CHF (congestive heart failure)  MI, old  Thoracic compression fracture  Lumbago  Thoracic compression fracture  Lumbago  Laminectomy, spine, thoracic, with spinal cord stimulator insertion  S/P insertion of spinal cord stimulator  History of implantable cardioverter-defibrillator (ICD) placement  FHx: prostate cancer      POD# 8    S/P Placement of Spinal Cord Stimulator    Pt seen and examined at bedside. Pt sts he feels better today.     Allergies    No Known Allergies    Intolerances        Vital Signs Last 24 Hrs  T(C): 35.4 (02 Apr 2019 06:00), Max: 36.1 (01 Apr 2019 13:53)  T(F): 95.7 (02 Apr 2019 06:00), Max: 97 (01 Apr 2019 13:53)  HR: 70 (02 Apr 2019 06:00) (70 - 70)  BP: 102/76 (02 Apr 2019 06:00) (102/76 - 121/75)  BP(mean): --  RR: 17 (02 Apr 2019 06:00) (16 - 18)  SpO2: --      acetaminophen   Tablet .. 650 milliGRAM(s) Oral every 6 hours PRN  apixaban 5 milliGRAM(s) Oral every 12 hours  bisacodyl Suppository 10 milliGRAM(s) Rectal daily PRN  clonazePAM Tablet 1 milliGRAM(s) Oral every 12 hours  docusate sodium 100 milliGRAM(s) Oral three times a day  furosemide    Tablet 80 milliGRAM(s) Oral daily  lidocaine   Patch 1 Patch Transdermal every 24 hours  lurasidone 60 milliGRAM(s) Oral daily  methocarbamol 750 milliGRAM(s) Oral every 8 hours  multivitamin 1 Tablet(s) Oral daily  ondansetron Injectable 4 milliGRAM(s) IV Push every 6 hours PRN  oxyCODONE    5 mG/acetaminophen 325 mG 2 Tablet(s) Oral every 4 hours PRN  pantoprazole    Tablet 40 milliGRAM(s) Oral before breakfast  polyethylene glycol 3350 17 Gram(s) Oral daily PRN  senna 2 Tablet(s) Oral at bedtime  sertraline 200 milliGRAM(s) Oral daily  tamsulosin 0.4 milliGRAM(s) Oral at bedtime        04-01-19 @ 07:01  -  04-02-19 @ 07:00  --------------------------------------------------------  IN: 0 mL / OUT: 325 mL / NET: -325 mL    04-02-19 @ 07:01  -  04-02-19 @ 12:07  --------------------------------------------------------  IN: 240 mL / OUT: 700 mL / NET: -460 mL          Exam:  AAOX3. Verbal function intact  follows commands  Motor: MAEx4  5/5 power b/l LE  Sensation: intact b/l        Wound: healing, no erythema, no drainage      Assessment/Plan: as above  cont PT  SW for D/C Planning  will discuss w attg

## 2019-04-03 PROBLEM — N40.0 BENIGN PROSTATIC HYPERPLASIA WITHOUT LOWER URINARY TRACT SYMPTOMS: Chronic | Status: ACTIVE | Noted: 2019-03-25

## 2019-04-03 PROBLEM — K21.9 GASTRO-ESOPHAGEAL REFLUX DISEASE WITHOUT ESOPHAGITIS: Chronic | Status: ACTIVE | Noted: 2019-03-25

## 2019-04-03 PROBLEM — I25.2 OLD MYOCARDIAL INFARCTION: Chronic | Status: ACTIVE | Noted: 2019-03-25

## 2019-04-03 PROBLEM — Z95.810 PRESENCE OF AUTOMATIC (IMPLANTABLE) CARDIAC DEFIBRILLATOR: Chronic | Status: ACTIVE | Noted: 2019-03-25

## 2019-04-03 PROBLEM — C61 MALIGNANT NEOPLASM OF PROSTATE: Chronic | Status: ACTIVE | Noted: 2019-03-25

## 2019-04-03 PROBLEM — F41.9 ANXIETY DISORDER, UNSPECIFIED: Chronic | Status: ACTIVE | Noted: 2019-03-25

## 2019-04-03 PROBLEM — I50.9 HEART FAILURE, UNSPECIFIED: Chronic | Status: ACTIVE | Noted: 2019-03-25

## 2019-04-03 PROBLEM — F31.9 BIPOLAR DISORDER, UNSPECIFIED: Chronic | Status: ACTIVE | Noted: 2019-03-25

## 2019-04-03 LAB — SURGICAL PATHOLOGY STUDY: SIGNIFICANT CHANGE UP

## 2019-04-03 RX ADMIN — APIXABAN 5 MILLIGRAM(S): 2.5 TABLET, FILM COATED ORAL at 05:30

## 2019-04-03 RX ADMIN — OXYCODONE AND ACETAMINOPHEN 2 TABLET(S): 5; 325 TABLET ORAL at 10:33

## 2019-04-03 RX ADMIN — OXYCODONE AND ACETAMINOPHEN 2 TABLET(S): 5; 325 TABLET ORAL at 19:23

## 2019-04-03 RX ADMIN — Medication 100 MILLIGRAM(S): at 21:18

## 2019-04-03 RX ADMIN — OXYCODONE AND ACETAMINOPHEN 2 TABLET(S): 5; 325 TABLET ORAL at 15:04

## 2019-04-03 RX ADMIN — OXYCODONE AND ACETAMINOPHEN 2 TABLET(S): 5; 325 TABLET ORAL at 00:23

## 2019-04-03 RX ADMIN — OXYCODONE AND ACETAMINOPHEN 2 TABLET(S): 5; 325 TABLET ORAL at 05:30

## 2019-04-03 RX ADMIN — Medication 1 MILLIGRAM(S): at 10:02

## 2019-04-03 RX ADMIN — PANTOPRAZOLE SODIUM 40 MILLIGRAM(S): 20 TABLET, DELAYED RELEASE ORAL at 05:30

## 2019-04-03 RX ADMIN — METHOCARBAMOL 750 MILLIGRAM(S): 500 TABLET, FILM COATED ORAL at 05:31

## 2019-04-03 RX ADMIN — SENNA PLUS 2 TABLET(S): 8.6 TABLET ORAL at 21:18

## 2019-04-03 RX ADMIN — Medication 80 MILLIGRAM(S): at 05:30

## 2019-04-03 RX ADMIN — Medication 1 TABLET(S): at 12:10

## 2019-04-03 RX ADMIN — OXYCODONE AND ACETAMINOPHEN 2 TABLET(S): 5; 325 TABLET ORAL at 10:03

## 2019-04-03 RX ADMIN — METHOCARBAMOL 750 MILLIGRAM(S): 500 TABLET, FILM COATED ORAL at 21:18

## 2019-04-03 RX ADMIN — Medication 1 MILLIGRAM(S): at 21:17

## 2019-04-03 RX ADMIN — OXYCODONE AND ACETAMINOPHEN 2 TABLET(S): 5; 325 TABLET ORAL at 21:18

## 2019-04-03 RX ADMIN — SERTRALINE 200 MILLIGRAM(S): 25 TABLET, FILM COATED ORAL at 12:10

## 2019-04-03 RX ADMIN — APIXABAN 5 MILLIGRAM(S): 2.5 TABLET, FILM COATED ORAL at 17:30

## 2019-04-03 RX ADMIN — TAMSULOSIN HYDROCHLORIDE 0.4 MILLIGRAM(S): 0.4 CAPSULE ORAL at 21:18

## 2019-04-03 RX ADMIN — Medication 100 MILLIGRAM(S): at 05:30

## 2019-04-03 RX ADMIN — LURASIDONE HYDROCHLORIDE 60 MILLIGRAM(S): 40 TABLET ORAL at 12:08

## 2019-04-03 NOTE — PROGRESS NOTE ADULT - SUBJECTIVE AND OBJECTIVE BOX
Subjective: 49yMale with a pmhx of M54.16, 82092, 12399, 45790/CONVERSIONS  M54.16, 86611, 49207, 64722  ^M54.16, 10527, 22463, 06697/CONVERSIONS  MEWS Score  GERD (gastroesophageal reflux disease)  Anxiety and depression  Bipolar 1 disorder  AICD present, double chamber  Prostate cancer  BPH (benign prostatic hyperplasia)  CHF (congestive heart failure)  MI, old  Thoracic compression fracture  Lumbago  Thoracic compression fracture  Lumbago  Laminectomy, spine, thoracic, with spinal cord stimulator insertion  S/P insertion of spinal cord stimulator  History of implantable cardioverter-defibrillator (ICD) placement  FHx: prostate cancer    POD# 9    S/P Placement of Spinal Cord Stimulator    Pt seen and examined at bedside. Pt sts he feels ok today.  Awaiting approval for rehab facility.    Allergies    No Known Allergies    Intolerances        Vital Signs Last 24 Hrs  T(C): 35.9 (03 Apr 2019 05:46), Max: 36 (02 Apr 2019 14:06)  T(F): 96.7 (03 Apr 2019 05:46), Max: 96.8 (02 Apr 2019 14:06)  HR: 70 (03 Apr 2019 05:46) (70 - 70)  BP: 115/66 (03 Apr 2019 05:46) (115/66 - 118/70)  BP(mean): --  RR: 18 (03 Apr 2019 05:46) (18 - 18)  SpO2: --      acetaminophen   Tablet .. 650 milliGRAM(s) Oral every 6 hours PRN  apixaban 5 milliGRAM(s) Oral every 12 hours  bisacodyl Suppository 10 milliGRAM(s) Rectal daily PRN  clonazePAM Tablet 1 milliGRAM(s) Oral every 12 hours  docusate sodium 100 milliGRAM(s) Oral three times a day  furosemide    Tablet 80 milliGRAM(s) Oral daily  lidocaine   Patch 1 Patch Transdermal every 24 hours  lurasidone 60 milliGRAM(s) Oral daily  methocarbamol 750 milliGRAM(s) Oral every 8 hours  multivitamin 1 Tablet(s) Oral daily  ondansetron Injectable 4 milliGRAM(s) IV Push every 6 hours PRN  oxyCODONE    5 mG/acetaminophen 325 mG 2 Tablet(s) Oral every 4 hours PRN  pantoprazole    Tablet 40 milliGRAM(s) Oral before breakfast  polyethylene glycol 3350 17 Gram(s) Oral daily PRN  senna 2 Tablet(s) Oral at bedtime  sertraline 200 milliGRAM(s) Oral daily  tamsulosin 0.4 milliGRAM(s) Oral at bedtime        04-02-19 @ 07:01  -  04-03-19 @ 07:00  --------------------------------------------------------  IN: 660 mL / OUT: 1730 mL / NET: -1070 mL    04-03-19 @ 07:01  -  04-03-19 @ 13:14  --------------------------------------------------------  IN: 420 mL / OUT: 1150 mL / NET: -730 mL            Exam:  AAOX3. Verbal function intact  follows commands  Motor: MAEx4  Sensation: intact b/l        Wound: healing, no erythema, no drainage      Assessment/Plan: as above  cont PT  Attg to do peer to peer today  SW for D/C Planning  will discuss w attg

## 2019-04-04 ENCOUNTER — TRANSCRIPTION ENCOUNTER (OUTPATIENT)
Age: 49
End: 2019-04-04

## 2019-04-04 VITALS
HEART RATE: 70 BPM | SYSTOLIC BLOOD PRESSURE: 135 MMHG | DIASTOLIC BLOOD PRESSURE: 94 MMHG | RESPIRATION RATE: 18 BRPM | TEMPERATURE: 97 F

## 2019-04-04 RX ORDER — SERTRALINE 25 MG/1
2 TABLET, FILM COATED ORAL
Qty: 0 | Refills: 0 | COMMUNITY
Start: 2019-04-04

## 2019-04-04 RX ORDER — ALPRAZOLAM 0.25 MG
1 TABLET ORAL
Qty: 0 | Refills: 0 | COMMUNITY

## 2019-04-04 RX ORDER — DOCUSATE SODIUM 100 MG
1 CAPSULE ORAL
Qty: 0 | Refills: 0 | COMMUNITY
Start: 2019-04-04

## 2019-04-04 RX ORDER — PANTOPRAZOLE SODIUM 20 MG/1
1 TABLET, DELAYED RELEASE ORAL
Qty: 0 | Refills: 0 | COMMUNITY
Start: 2019-04-04

## 2019-04-04 RX ORDER — TAMSULOSIN HYDROCHLORIDE 0.4 MG/1
1 CAPSULE ORAL
Qty: 0 | Refills: 0 | COMMUNITY
Start: 2019-04-04

## 2019-04-04 RX ORDER — SENNA PLUS 8.6 MG/1
2 TABLET ORAL
Qty: 0 | Refills: 0 | COMMUNITY
Start: 2019-04-04

## 2019-04-04 RX ORDER — ACETAMINOPHEN 500 MG
2 TABLET ORAL
Qty: 0 | Refills: 0 | COMMUNITY
Start: 2019-04-04

## 2019-04-04 RX ORDER — FUROSEMIDE 40 MG
1 TABLET ORAL
Qty: 0 | Refills: 0 | COMMUNITY
Start: 2019-04-04

## 2019-04-04 RX ORDER — MORPHINE SULFATE 50 MG/1
2 CAPSULE, EXTENDED RELEASE ORAL ONCE
Qty: 0 | Refills: 0 | Status: DISCONTINUED | OUTPATIENT
Start: 2019-04-04 | End: 2019-04-04

## 2019-04-04 RX ORDER — LURASIDONE HYDROCHLORIDE 40 MG/1
1 TABLET ORAL
Qty: 0 | Refills: 0 | COMMUNITY

## 2019-04-04 RX ORDER — APIXABAN 2.5 MG/1
1 TABLET, FILM COATED ORAL
Qty: 0 | Refills: 0 | COMMUNITY

## 2019-04-04 RX ORDER — LIDOCAINE 4 G/100G
1 CREAM TOPICAL
Qty: 0 | Refills: 0 | COMMUNITY
Start: 2019-04-04

## 2019-04-04 RX ORDER — APIXABAN 2.5 MG/1
1 TABLET, FILM COATED ORAL
Qty: 0 | Refills: 0 | COMMUNITY
Start: 2019-04-04

## 2019-04-04 RX ORDER — METHOCARBAMOL 500 MG/1
1 TABLET, FILM COATED ORAL
Qty: 0 | Refills: 0 | COMMUNITY
Start: 2019-04-04

## 2019-04-04 RX ORDER — POLYETHYLENE GLYCOL 3350 17 G/17G
17 POWDER, FOR SOLUTION ORAL
Qty: 0 | Refills: 0 | COMMUNITY
Start: 2019-04-04

## 2019-04-04 RX ORDER — SERTRALINE 25 MG/1
200 TABLET, FILM COATED ORAL
Qty: 0 | Refills: 0 | COMMUNITY

## 2019-04-04 RX ORDER — CLONAZEPAM 1 MG
1 TABLET ORAL
Qty: 0 | Refills: 0 | COMMUNITY
Start: 2019-04-04

## 2019-04-04 RX ORDER — ONDANSETRON 8 MG/1
4 TABLET, FILM COATED ORAL
Qty: 0 | Refills: 0 | COMMUNITY
Start: 2019-04-04

## 2019-04-04 RX ORDER — LURASIDONE HYDROCHLORIDE 40 MG/1
1 TABLET ORAL
Qty: 0 | Refills: 0 | COMMUNITY
Start: 2019-04-04

## 2019-04-04 RX ORDER — MORPHINE SULFATE 50 MG/1
2 CAPSULE, EXTENDED RELEASE ORAL
Qty: 0 | Refills: 0 | COMMUNITY
Start: 2019-04-04

## 2019-04-04 RX ADMIN — LURASIDONE HYDROCHLORIDE 60 MILLIGRAM(S): 40 TABLET ORAL at 11:58

## 2019-04-04 RX ADMIN — OXYCODONE AND ACETAMINOPHEN 2 TABLET(S): 5; 325 TABLET ORAL at 14:54

## 2019-04-04 RX ADMIN — METHOCARBAMOL 750 MILLIGRAM(S): 500 TABLET, FILM COATED ORAL at 05:34

## 2019-04-04 RX ADMIN — Medication 1 TABLET(S): at 11:49

## 2019-04-04 RX ADMIN — PANTOPRAZOLE SODIUM 40 MILLIGRAM(S): 20 TABLET, DELAYED RELEASE ORAL at 05:34

## 2019-04-04 RX ADMIN — Medication 100 MILLIGRAM(S): at 05:33

## 2019-04-04 RX ADMIN — OXYCODONE AND ACETAMINOPHEN 2 TABLET(S): 5; 325 TABLET ORAL at 02:51

## 2019-04-04 RX ADMIN — OXYCODONE AND ACETAMINOPHEN 2 TABLET(S): 5; 325 TABLET ORAL at 09:50

## 2019-04-04 RX ADMIN — MORPHINE SULFATE 2 MILLIGRAM(S): 50 CAPSULE, EXTENDED RELEASE ORAL at 12:34

## 2019-04-04 RX ADMIN — OXYCODONE AND ACETAMINOPHEN 2 TABLET(S): 5; 325 TABLET ORAL at 01:12

## 2019-04-04 RX ADMIN — SERTRALINE 200 MILLIGRAM(S): 25 TABLET, FILM COATED ORAL at 11:49

## 2019-04-04 RX ADMIN — Medication 1 MILLIGRAM(S): at 09:54

## 2019-04-04 RX ADMIN — APIXABAN 5 MILLIGRAM(S): 2.5 TABLET, FILM COATED ORAL at 05:33

## 2019-04-04 RX ADMIN — OXYCODONE AND ACETAMINOPHEN 2 TABLET(S): 5; 325 TABLET ORAL at 05:33

## 2019-04-04 RX ADMIN — Medication 80 MILLIGRAM(S): at 05:34

## 2019-04-04 NOTE — DISCHARGE NOTE PROVIDER - CARE PROVIDER_API CALL
Gail Johnson)  [facility]  [address] [place] [address], [address]  [place] [address]  Phone: [phone]  Fax: [phone]  Follow Up Time:

## 2019-04-04 NOTE — DISCHARGE NOTE NURSING/CASE MANAGEMENT/SOCIAL WORK - NSDCPEPT PROEDHF_GEN_ALL_CORE
Call primary care provider for follow up after discharge/Low salt diet/Monitor weight daily/Activities as tolerated/Report signs and symptoms to primary care provider

## 2019-04-04 NOTE — DISCHARGE NOTE NURSING/CASE MANAGEMENT/SOCIAL WORK - NSDCDPATPORTLINK_GEN_ALL_CORE
You can access the [facility] Patient Portal, offered by [facility], by registering with the following website: [email or web address]

## 2019-04-04 NOTE — DISCHARGE NOTE PROVIDER - HOSPITAL COURSE
49 year old male admitted 3.25.19 for Placement of a Spinal Cord Stimulator. He did well post op. He ambulated with physical therapy. He advised Social work that he was homeless and did not have somewhere to go postop. He required physical therapy. He was finally authorized to go to a SNF on 4.4.19

## 2019-04-04 NOTE — PROGRESS NOTE ADULT - SUBJECTIVE AND OBJECTIVE BOX
POD# 10    S/P Placement of Spinal Cord Stimulator    Pt seen and examined at bedside. Pt c/o incisional pain at this time.     Vital Signs Last 24 Hrs  T(C): 35.7 (04 Apr 2019 05:42), Max: 36.5 (03 Apr 2019 13:49)  T(F): 96.3 (04 Apr 2019 05:42), Max: 97.7 (03 Apr 2019 13:49)  HR: 70 (04 Apr 2019 05:42) (70 - 70)  BP: 108/62 (04 Apr 2019 05:42) (108/62 - 116/73)  BP(mean): --  RR: 18 (04 Apr 2019 05:42) (18 - 20)  SpO2: --    PHYSICAL EXAM:  Strength 5/5  + Sensation to light touch  Incisions intact    MEDICATIONS:  Antibiotics:    Neuro:  acetaminophen   Tablet .. 650 milliGRAM(s) Oral every 6 hours PRN  clonazePAM Tablet 1 milliGRAM(s) Oral every 12 hours  lurasidone 60 milliGRAM(s) Oral daily  methocarbamol 750 milliGRAM(s) Oral every 8 hours  ondansetron Injectable 4 milliGRAM(s) IV Push every 6 hours PRN  oxyCODONE    5 mG/acetaminophen 325 mG 2 Tablet(s) Oral every 4 hours PRN  sertraline 200 milliGRAM(s) Oral daily    Anticoagulation:  apixaban 5 milliGRAM(s) Oral every 12 hours    OTHER:  bisacodyl Suppository 10 milliGRAM(s) Rectal daily PRN  docusate sodium 100 milliGRAM(s) Oral three times a day  furosemide    Tablet 80 milliGRAM(s) Oral daily  lidocaine   Patch 1 Patch Transdermal every 24 hours  pantoprazole    Tablet 40 milliGRAM(s) Oral before breakfast  polyethylene glycol 3350 17 Gram(s) Oral daily PRN  senna 2 Tablet(s) Oral at bedtime  tamsulosin 0.4 milliGRAM(s) Oral at bedtime    IVF:  multivitamin 1 Tablet(s) Oral daily    Assessment:  As above    Plan:  D/C to SNF today

## 2019-04-12 DIAGNOSIS — I25.2 OLD MYOCARDIAL INFARCTION: ICD-10-CM

## 2019-04-12 DIAGNOSIS — S22.009D UNSPECIFIED FRACTURE OF UNSPECIFIED THORACIC VERTEBRA, SUBSEQUENT ENCOUNTER FOR FRACTURE WITH ROUTINE HEALING: ICD-10-CM

## 2019-04-12 DIAGNOSIS — G89.29 OTHER CHRONIC PAIN: ICD-10-CM

## 2019-04-12 DIAGNOSIS — Z85.46 PERSONAL HISTORY OF MALIGNANT NEOPLASM OF PROSTATE: ICD-10-CM

## 2019-04-12 DIAGNOSIS — C61 MALIGNANT NEOPLASM OF PROSTATE: ICD-10-CM

## 2019-04-12 DIAGNOSIS — R26.89 OTHER ABNORMALITIES OF GAIT AND MOBILITY: ICD-10-CM

## 2019-04-12 DIAGNOSIS — Z72.89 OTHER PROBLEMS RELATED TO LIFESTYLE: ICD-10-CM

## 2019-04-12 DIAGNOSIS — M48.54XA COLLAPSED VERTEBRA, NOT ELSEWHERE CLASSIFIED, THORACIC REGION, INITIAL ENCOUNTER FOR FRACTURE: ICD-10-CM

## 2019-04-12 DIAGNOSIS — Z59.0 HOMELESSNESS: ICD-10-CM
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
## 2019-04-12 DIAGNOSIS — K21.9 GASTRO-ESOPHAGEAL REFLUX DISEASE WITHOUT ESOPHAGITIS: ICD-10-CM

## 2019-04-12 DIAGNOSIS — F31.9 BIPOLAR DISORDER, UNSPECIFIED: ICD-10-CM

## 2019-04-12 DIAGNOSIS — F41.9 ANXIETY DISORDER, UNSPECIFIED: ICD-10-CM

## 2019-04-12 DIAGNOSIS — Z95.810 PRESENCE OF AUTOMATIC (IMPLANTABLE) CARDIAC DEFIBRILLATOR: ICD-10-CM

## 2019-04-12 DIAGNOSIS — Z80.42 FAMILY HISTORY OF MALIGNANT NEOPLASM OF PROSTATE: ICD-10-CM

## 2019-04-12 DIAGNOSIS — I42.6 ALCOHOLIC CARDIOMYOPATHY: ICD-10-CM

## 2019-04-12 DIAGNOSIS — M19.90 UNSPECIFIED OSTEOARTHRITIS, UNSPECIFIED SITE: ICD-10-CM

## 2019-04-12 DIAGNOSIS — N40.0 BENIGN PROSTATIC HYPERPLASIA WITHOUT LOWER URINARY TRACT SYMPTOMS: ICD-10-CM

## 2019-04-12 DIAGNOSIS — I50.9 HEART FAILURE, UNSPECIFIED: ICD-10-CM

## 2019-04-12 SDOH — ECONOMIC STABILITY - HOUSING INSECURITY: HOMELESSNESS: Z59.0

## 2019-05-01 ENCOUNTER — APPOINTMENT (OUTPATIENT)
Dept: NEUROSURGERY | Facility: CLINIC | Age: 49
End: 2019-05-01
Payer: MEDICARE

## 2019-05-01 DIAGNOSIS — Z78.9 OTHER SPECIFIED HEALTH STATUS: ICD-10-CM

## 2019-05-01 PROCEDURE — 99024 POSTOP FOLLOW-UP VISIT: CPT

## 2019-05-01 NOTE — HISTORY OF PRESENT ILLNESS
[FreeTextEntry1] : doing well s/p laminectomy for SCS placement. Still with some preincisional thoracic pain/spasm, expected. Patient states he is currently not drinking alcohol. He is here for his initial programming with [employer]

## 2019-05-22 ENCOUNTER — APPOINTMENT (OUTPATIENT)
Dept: NEUROSURGERY | Facility: CLINIC | Age: 49
End: 2019-05-22
Payer: MEDICARE

## 2019-05-22 PROCEDURE — 99024 POSTOP FOLLOW-UP VISIT: CPT

## 2019-05-28 NOTE — PHYSICAL EXAM
[FreeTextEntry1] : Constitutional: Well appearing, no distress\par HEENT: Normocephalic Atraumatic\par Psychiatric: Alert and oriented to all spheres, normal mood\par Pulmonary: no respiratory distress\par Abdomen: non-distended\par Vascular/Extremities: no edema, no cyanosis, no clubbing\par \par \par Neurologic: \par CN II-XII grossly intact\par ROM:decreased  in cervical and lumbar spine\par Palpation: no pain to palpation in cervical spine, no pain to palpation in lumbar spine\par Strength: Full strength in all major muscle groups, no atrophy\par Sensation: Full sensation to light touch in all extremities\par Reflexes: \par               2+ patellar\par               2+ biceps\par               2+ ankle jerk\par              No Dent's\par              No clonus\par              No babinski\par \par Signs:\par SLR negative\par L'hermitte's negative\par \par Gait:antalgic\par \par Incisions healed well\par \par \par

## 2019-05-28 NOTE — HISTORY OF PRESENT ILLNESS
[FreeTextEntry1] : Patient presents today s/p spinal cord stimulator done on 3/25/19. Patient complaining of low back pain, back in his left ribs, and pain above the thoracic surgical site. He states the programs he has on the stimulator is not alleviate the pain. [employer]  is present to assist with the programming.

## 2019-07-05 ENCOUNTER — FORM ENCOUNTER (OUTPATIENT)
Age: 49
End: 2019-07-05

## 2019-07-06 ENCOUNTER — OUTPATIENT (OUTPATIENT)
Dept: OUTPATIENT SERVICES | Facility: HOSPITAL | Age: 49
LOS: 1 days | Discharge: HOME | End: 2019-07-06
Payer: MEDICARE

## 2019-07-06 DIAGNOSIS — S22.000A WEDGE COMPRESSION FRACTURE OF UNSPECIFIED THORACIC VERTEBRA, INITIAL ENCOUNTER FOR CLOSED FRACTURE: ICD-10-CM

## 2019-07-06 DIAGNOSIS — Z95.810 PRESENCE OF AUTOMATIC (IMPLANTABLE) CARDIAC DEFIBRILLATOR: Chronic | ICD-10-CM

## 2019-07-06 DIAGNOSIS — Z80.42 FAMILY HISTORY OF MALIGNANT NEOPLASM OF PROSTATE: Chronic | ICD-10-CM

## 2019-07-06 DIAGNOSIS — Z98.890 OTHER SPECIFIED POSTPROCEDURAL STATES: Chronic | ICD-10-CM

## 2019-07-06 PROCEDURE — 78320: CPT | Mod: 26

## 2019-09-18 ENCOUNTER — RX RENEWAL (OUTPATIENT)
Age: 49
End: 2019-09-18

## 2019-09-18 RX ORDER — CHLORZOXAZONE 500 MG/1
500 TABLET ORAL EVERY 8 HOURS
Qty: 90 | Refills: 0 | Status: DISCONTINUED | COMMUNITY
Start: 2019-03-21 | End: 2019-09-18

## 2019-09-18 RX ORDER — NAPROXEN 500 MG/1
500 TABLET ORAL
Qty: 60 | Refills: 2 | Status: ACTIVE | COMMUNITY
Start: 2019-05-01 | End: 1900-01-01

## 2019-09-18 RX ORDER — DOCUSATE SODIUM 100 MG/1
100 CAPSULE ORAL
Qty: 10 | Refills: 0 | Status: DISCONTINUED | COMMUNITY
Start: 2019-03-21 | End: 2019-09-18

## 2019-09-18 RX ORDER — TIZANIDINE 4 MG/1
4 TABLET ORAL EVERY 8 HOURS
Qty: 90 | Refills: 0 | Status: DISCONTINUED | COMMUNITY
Start: 2019-05-01 | End: 2019-09-18

## 2019-09-18 RX ORDER — OXYCODONE AND ACETAMINOPHEN 5; 325 MG/1; MG/1
5-325 TABLET ORAL
Qty: 60 | Refills: 0 | Status: DISCONTINUED | COMMUNITY
Start: 2019-03-21 | End: 2019-09-18

## 2019-09-23 ENCOUNTER — APPOINTMENT (OUTPATIENT)
Dept: NEUROSURGERY | Facility: CLINIC | Age: 49
End: 2019-09-23

## 2019-11-11 RX ORDER — BACLOFEN 10 MG/1
10 TABLET ORAL 3 TIMES DAILY
Qty: 90 | Refills: 2 | Status: ACTIVE | COMMUNITY
Start: 2019-05-30 | End: 1900-01-01

## 2019-12-05 ENCOUNTER — APPOINTMENT (OUTPATIENT)
Dept: NEUROSURGERY | Facility: CLINIC | Age: 49
End: 2019-12-05

## 2019-12-05 VITALS — BODY MASS INDEX: 31.78 KG/M2 | WEIGHT: 222 LBS | HEIGHT: 70 IN

## 2019-12-18 ENCOUNTER — APPOINTMENT (OUTPATIENT)
Dept: NEUROSURGERY | Facility: CLINIC | Age: 49
End: 2019-12-18
Payer: MEDICARE

## 2019-12-18 VITALS — WEIGHT: 230 LBS | BODY MASS INDEX: 32.93 KG/M2 | HEIGHT: 70 IN

## 2019-12-18 PROCEDURE — 99213 OFFICE O/P EST LOW 20 MIN: CPT

## 2019-12-20 NOTE — HISTORY OF PRESENT ILLNESS
[FreeTextEntry1] : Pt is here for programming with [employer] [facility]. Good coverage, new programs given, education given. he will follow up in 1 month to review efficacy.

## 2019-12-27 RX ORDER — CHLORZOXAZONE 500 MG/1
500 TABLET ORAL EVERY 8 HOURS
Qty: 90 | Refills: 1 | Status: ACTIVE | COMMUNITY
Start: 2019-12-27 | End: 1900-01-01

## 2021-06-16 ENCOUNTER — APPOINTMENT (OUTPATIENT)
Dept: NEUROSURGERY | Facility: CLINIC | Age: 51
End: 2021-06-16
Payer: MEDICARE

## 2021-06-16 VITALS — HEIGHT: 70 IN | WEIGHT: 223 LBS | BODY MASS INDEX: 31.92 KG/M2

## 2021-06-16 PROCEDURE — 99214 OFFICE O/P EST MOD 30 MIN: CPT
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
## 2021-06-21 NOTE — PHYSICAL EXAM
[FreeTextEntry1] : Constitutional: Well appearing, no distress\par HEENT: Normocephalic Atraumatic\par Psychiatric: Alert and oriented to all spheres, normal mood\par Pulmonary: no respiratory distress\par Abdomen: non-distended\par Vascular/Extremities: no edema, no cyanosis, no clubbing\par \par \par Neurologic: \par CN II-XII grossly intact\par ROM: decreased in LS spine\par Palpation: no pain to palpation in cervical spine, no pain to palpation in lumbar spine\par Strength: Full strength in all major muscle groups, no atrophy, except cant test left distal upper ext due to casting. Can move fingers well. \par Sensation: Full sensation to light touch in all extremities\par Reflexes: \par               2+ patellar\par               2+ biceps\par               2+ ankle jerk\par              No Dent's\par              No clonus\par              No babinski\par \par Signs:\par SLR negative\par L'hermitte's negative\par \par Gait:antalgic\par \par \par \par \par

## 2021-07-02 LAB
BASOPHILS # BLD AUTO: 0.06 K/UL
BASOPHILS NFR BLD AUTO: 0.7 %
EOSINOPHIL # BLD AUTO: 0.1 K/UL
EOSINOPHIL NFR BLD AUTO: 1.1 %
HCT VFR BLD CALC: 44.9 %
HGB BLD-MCNC: 15 G/DL
IMM GRANULOCYTES NFR BLD AUTO: 0.6 %
LYMPHOCYTES # BLD AUTO: 2.25 K/UL
LYMPHOCYTES NFR BLD AUTO: 24.8 %
MAN DIFF?: NORMAL
MCHC RBC-ENTMCNC: 31.6 PG
MCHC RBC-ENTMCNC: 33.4 G/DL
MCV RBC AUTO: 94.7 FL
MONOCYTES # BLD AUTO: 0.92 K/UL
MONOCYTES NFR BLD AUTO: 10.1 %
NEUTROPHILS # BLD AUTO: 5.71 K/UL
NEUTROPHILS NFR BLD AUTO: 62.7 %
PLATELET # BLD AUTO: 155 K/UL
RBC # BLD: 4.74 M/UL
RBC # FLD: 15.3 %
WBC # FLD AUTO: 9.09 K/UL

## 2021-07-06 LAB
ALBUMIN SERPL ELPH-MCNC: 4.8 G/DL
ALP BLD-CCNC: 157 U/L
ALT SERPL-CCNC: 38 U/L
ANION GAP SERPL CALC-SCNC: 12 MMOL/L
AST SERPL-CCNC: 42 U/L
BILIRUB SERPL-MCNC: 0.7 MG/DL
BUN SERPL-MCNC: 13 MG/DL
CALCIUM SERPL-MCNC: 9.6 MG/DL
CHLORIDE SERPL-SCNC: 103 MMOL/L
CO2 SERPL-SCNC: 28 MMOL/L
CREAT SERPL-MCNC: 0.9 MG/DL
GLUCOSE SERPL-MCNC: 93 MG/DL
INR PPP: 1.03 RATIO
POTASSIUM SERPL-SCNC: 4.9 MMOL/L
PROT SERPL-MCNC: 7.3 G/DL
PT BLD: 11.8 SEC
SODIUM SERPL-SCNC: 143 MMOL/L

## 2021-07-19 ENCOUNTER — APPOINTMENT (OUTPATIENT)
Dept: NEUROSURGERY | Facility: CLINIC | Age: 51
End: 2021-07-19

## 2021-08-02 ENCOUNTER — RESULT REVIEW (OUTPATIENT)
Age: 51
End: 2021-08-02

## 2021-08-02 ENCOUNTER — OUTPATIENT (OUTPATIENT)
Dept: OUTPATIENT SERVICES | Facility: HOSPITAL | Age: 51
LOS: 1 days | Discharge: HOME | End: 2021-08-02
Payer: MEDICARE

## 2021-08-02 DIAGNOSIS — S22.000A WEDGE COMPRESSION FRACTURE OF UNSPECIFIED THORACIC VERTEBRA, INITIAL ENCOUNTER FOR CLOSED FRACTURE: ICD-10-CM

## 2021-08-02 DIAGNOSIS — M54.5 LOW BACK PAIN: ICD-10-CM

## 2021-08-02 DIAGNOSIS — Z95.810 PRESENCE OF AUTOMATIC (IMPLANTABLE) CARDIAC DEFIBRILLATOR: Chronic | ICD-10-CM

## 2021-08-02 DIAGNOSIS — Z98.890 OTHER SPECIFIED POSTPROCEDURAL STATES: Chronic | ICD-10-CM

## 2021-08-02 DIAGNOSIS — E78.00 PURE HYPERCHOLESTEROLEMIA, UNSPECIFIED: ICD-10-CM

## 2021-08-02 DIAGNOSIS — M54.9 DORSALGIA, UNSPECIFIED: ICD-10-CM

## 2021-08-02 DIAGNOSIS — Z80.42 FAMILY HISTORY OF MALIGNANT NEOPLASM OF PROSTATE: Chronic | ICD-10-CM

## 2021-08-02 DIAGNOSIS — Z79.01 LONG TERM (CURRENT) USE OF ANTICOAGULANTS: ICD-10-CM

## 2021-08-02 PROCEDURE — 72125 CT NECK SPINE W/O DYE: CPT | Mod: 26

## 2021-08-02 PROCEDURE — 72131 CT LUMBAR SPINE W/O DYE: CPT | Mod: 26

## 2021-08-02 PROCEDURE — 72270 MYELOGPHY 2/> SPINE REGIONS: CPT | Mod: 26

## 2021-08-02 PROCEDURE — 72128 CT CHEST SPINE W/O DYE: CPT | Mod: 26

## 2021-08-09 ENCOUNTER — APPOINTMENT (OUTPATIENT)
Dept: NEUROSURGERY | Facility: CLINIC | Age: 51
End: 2021-08-09
Payer: MEDICARE

## 2021-08-11 ENCOUNTER — APPOINTMENT (OUTPATIENT)
Dept: NEUROSURGERY | Facility: CLINIC | Age: 51
End: 2021-08-11
Payer: MEDICARE

## 2021-08-11 DIAGNOSIS — M10.9 GOUT, UNSPECIFIED: ICD-10-CM

## 2021-08-11 PROCEDURE — 99213 OFFICE O/P EST LOW 20 MIN: CPT

## 2021-08-24 LAB
ANA SER IF-ACNC: NEGATIVE
BASOPHILS # BLD AUTO: 0.04 K/UL
BASOPHILS NFR BLD AUTO: 0.5 %
CRP SERPL-MCNC: 29 MG/L
EOSINOPHIL # BLD AUTO: 0.08 K/UL
EOSINOPHIL NFR BLD AUTO: 0.9 %
ERYTHROCYTE [SEDIMENTATION RATE] IN BLOOD BY WESTERGREN METHOD: 1 MM/HR
HCT VFR BLD CALC: 43.3 %
HGB BLD-MCNC: 14.4 G/DL
IMM GRANULOCYTES NFR BLD AUTO: 0.5 %
LYMPHOCYTES # BLD AUTO: 1.73 K/UL
LYMPHOCYTES NFR BLD AUTO: 20.2 %
MAN DIFF?: NORMAL
MCHC RBC-ENTMCNC: 31.5 PG
MCHC RBC-ENTMCNC: 33.3 G/DL
MCV RBC AUTO: 94.7 FL
MONOCYTES # BLD AUTO: 0.97 K/UL
MONOCYTES NFR BLD AUTO: 11.3 %
NEUTROPHILS # BLD AUTO: 5.71 K/UL
NEUTROPHILS NFR BLD AUTO: 66.6 %
PLATELET # BLD AUTO: 150 K/UL
RBC # BLD: 4.57 M/UL
RBC # FLD: 15 %
RHEUMATOID FACT SER QL: <10 IU/ML
URATE SERPL-MCNC: 7.7 MG/DL
WBC # FLD AUTO: 8.57 K/UL

## 2021-08-25 NOTE — PHYSICAL EXAM
[FreeTextEntry1] : Constitutional: Well appearing, no distress\par HEENT: Normocephalic Atraumatic\par Psychiatric: Alert and oriented to all spheres, normal mood\par Pulmonary: no respiratory distress\par Abdomen: non-distended\par Vascular/Extremities: no edema, no cyanosis, no clubbing\par \par \par Neurologic: \par CN II-XII grossly intact\par ROM: diminished in spine due to pain \par Palpation: no pain to palpation in cervical spine, no pain to palpation in lumbar spine\par Strength: Full strength in all major muscle groups, no atrophy\par Sensation: Full sensation to light touch in all extremities\par Reflexes: \par               2+ patellar\par               2+ biceps\par               2+ ankle jerk\par              No Dent's\par              No clonus\par              No babinski\par \par Signs:\par SLR negative\par L'hermitte's negative\par \par Gait: toe, heel, tandem fluid\par \par \par \par \par right elbow swelling, erythema

## 2021-08-25 NOTE — HISTORY OF PRESENT ILLNESS
[FreeTextEntry1] : Pt here to review CT myelogram results which show known chronic compression fracture, mild multi-level degenerative changes without significant central stenosis. Alignment reasonably retained. \par \par Pt with diffuse pain which is severe in relation to milder imaging findings. \par \par Pt also noted to have severe swelling and warmth over his right elbow. Unclear if this is an infection/effucsion or could represent gout.

## 2021-08-25 NOTE — ASSESSMENT
[FreeTextEntry1] : I have directed Mr. Cordova to go to urgent care/ER if he cannot see his orthopedist in [place] today.\par \par We will send blood work to determine if there is a rtrheumatoid/AI component to his diffuse pain. \par \par I will se him back in follow up.

## 2021-12-08 ENCOUNTER — APPOINTMENT (OUTPATIENT)
Dept: NEUROSURGERY | Facility: CLINIC | Age: 51
End: 2021-12-08

## 2021-12-15 ENCOUNTER — APPOINTMENT (OUTPATIENT)
Dept: NEUROSURGERY | Facility: CLINIC | Age: 51
End: 2021-12-15
Payer: MEDICARE

## 2021-12-15 VITALS — HEIGHT: 70 IN | WEIGHT: 208 LBS | BODY MASS INDEX: 29.78 KG/M2

## 2021-12-15 DIAGNOSIS — M54.50 LOW BACK PAIN, UNSPECIFIED: ICD-10-CM

## 2021-12-15 DIAGNOSIS — S22.000A WEDGE COMPRESSION FRACTURE OF UNSPECIFIED THORACIC VERTEBRA, INITIAL ENCOUNTER FOR CLOSED FRACTURE: ICD-10-CM

## 2021-12-15 PROCEDURE — 99213 OFFICE O/P EST LOW 20 MIN: CPT

## 2021-12-20 PROBLEM — M54.50 LUMBAGO: Status: ACTIVE | Noted: 2019-01-24

## 2021-12-20 PROBLEM — S22.000A THORACIC COMPRESSION FRACTURE: Status: ACTIVE | Noted: 2019-01-24

## 2021-12-20 NOTE — ASSESSMENT
[FreeTextEntry1] : I am in agreement with his pain management doctor I believe retrial of SCS with lower lead placement at T12 and S1may be of significant benefit. davidvne that there is no structural surgery indicated and he has failed other management a revision trial is reasonable.

## 2021-12-20 NOTE — PHYSICAL EXAM
[FreeTextEntry1] : Constitutional: Well appearing, no distress\par HEENT: Normocephalic Atraumatic\par Psychiatric: Alert and oriented to all spheres, normal mood\par Pulmonary: no respiratory distress\par Abdomen: non-distended\par Vascular/Extremities: no edema, no cyanosis, no clubbing\par \par \par Neurologic: \par CN II-XII grossly intact\par ROM: significantly diminished in lumbosacral spine. \par Palpation: no pain to palpation in cervical spine, no pain to palpation in lumbar spine\par Strength: Full strength in all major muscle groups, no atrophy\par Sensation: Full sensation to light touch in all extremities\par Reflexes: \par               2+ patellar\par               2+ biceps\par               2+ ankle jerk\par              No Dent's\par              No clonus\par              No babinski\par \par Signs:\par SLR negative\par L'hermitte's negative\par \par Gait:slow antalgic due to pain \par \par \par \par

## 2021-12-20 NOTE — HISTORY OF PRESENT ILLNESS
[FreeTextEntry1] : Mr. Cordova, hx of lumbar laminectomy and [facility] in 2019 for back pain presents today in follow up, with continuous chronic back pain radiating to bilateral legs, worse on the right. HE reports the current SCS is not helping alleviate his pain, and is here to discuss about revising his SCS. Mr. Cordova has had CT myelogram which shows his chornic compression fractures, no significant stenosis is noted. \par \par Despite SCS placement with good coverage he is not having pain relief. HE has also undergone PT and for pain management treatment.

## 2023-02-20 NOTE — ANESTHESIA FOLLOW-UP NOTE - TYPE OF ANESTHESIA
MRI has been denied by insurance.   Discussed with Dr. Erickson: recommend eval by Dr. HORTENCIA Garrido.     Called Dr. Garrido's office. Able to see patient 2/24/23 at 1:30 PM, 2:30 PM or 3PM.     Patient notified. He states he will think this information over and call office back.  
Spoke with patient and he states he is scheduled tomorrow at  imaging for MRI and it was approved there. He will have results sent to Dr. Erickson and Dr. Rodriguez.  
General

## 2025-06-04 NOTE — PATIENT PROFILE ADULT - NSASFUNCLEVELADLAMBULATE_GEN_A_NUR
Nutrition Assessment     Type and Reason for Visit: Reassess    Nutrition Recommendations/Plan:   Follow for advancing diet, po intake, labs and weight     Malnutrition Assessment:  Malnutrition Status: Severe malnutrition    Nutrition Assessment:  Patient did have Carb control Cardiac diet ordered for breakfast.  Pt remained asleep and had not started on breakfast at time of visit.  Patient now NPO for permacath placement.    New wt not available for today.    Estimated Daily Nutrient Needs:  Energy (kcal):  2391-4058 kcals (8-15 kcals/kg) Weight Used for Energy Requirements: Current     Protein (g):  149g Weight Used for Protein Requirements: Ideal        Fluid (ml/day):  9718-1515 ml Method Used for Fluid Requirements: 1 ml/kcal    Nutrition Related Findings:   +2 pitting BLE edema., +1 BUE edema Wound Type: Multiple, Diabetic Ulcer, Wound Vac    Current Nutrition Therapies:    Diet NPO    Anthropometric Measures:  Height: 182.9 cm (6' 0.01\")  Current Body Wt: 125.3 kg (276 lb 3.8 oz)   BMI: 37.5        Nutrition Diagnosis:   Inadequate oral intake related to acute injury/trauma, increase demand for energy/nutrients as evidenced by NPO or clear liquid status due to medical condition, wounds, poor intake prior to admission, dialysis    Nutrition Interventions:   Food and/or Nutrient Delivery: Continue NPO  Nutrition Education/Counseling: No recommendation at this time  Coordination of Nutrition Care: Continue to monitor while inpatient       Goals:  Goals: Meet at least 75% of estimated needs, PO intake 50% or greater, Maintain adequate nutrition status  Type of Goal: Continue current goal  Previous Goal Met: No Progress toward Goal(s)    Nutrition Monitoring and Evaluation:   Behavioral-Environmental Outcomes: None Identified  Food/Nutrient Intake Outcomes: Progression of Nutrition  Physical Signs/Symptoms Outcomes: Biochemical Data, Fluid Status or Edema, Weight, Skin    Discharge Planning:    Too soon to  determine     Deidre Azevedo MS, RD, LD  Contact: [phone]     2 = assistive person